# Patient Record
Sex: FEMALE | Race: WHITE | NOT HISPANIC OR LATINO | Employment: FULL TIME | URBAN - METROPOLITAN AREA
[De-identification: names, ages, dates, MRNs, and addresses within clinical notes are randomized per-mention and may not be internally consistent; named-entity substitution may affect disease eponyms.]

---

## 2019-05-08 ENCOUNTER — TELEPHONE (OUTPATIENT)
Dept: TRANSPLANT | Facility: CLINIC | Age: 44
End: 2019-05-08

## 2019-05-08 NOTE — LETTER
May 8, 2019    Marty Moran MD  [facility] [address]      Dear Dr. Moran    Patient: Jazzy Borges   MR Number: [id number]   YOB: 1975     Thank you for the referral of Jazzy Borges to the [facility] program. An initial appointment will be scheduled for your patient with one of our Hepatologists.      Thank you again for your trust in our program.  If there is anything we can do for you or your staff, please feel free to contact us.        Sincerely,        [facility]  [address] [place] [id number]  [phone]

## 2019-05-08 NOTE — TELEPHONE ENCOUNTER
----- Message from Jonn Elizalde sent at 2019  9:15 AM CDT -----    Returned call to pt and told her that we rec'orlando her ref on  and it is pending review by the nurse. Explained to her how the process and the difference between gen hepat and liver txp.    -------------------------------------------------------------------------------------    Message   Received: Today   Message Contents   Sammie Washburn sent to [facility] Liver Referral Pool  Caller: elli Borges [phone] (Today,  9:03 AM)         Pt calling to check status of referral that was sent 2 weeks ago   Referral was sent by Dr. Mohan of [place], [place]     NP Elli Borges    79     pls contact pt with update

## 2019-05-08 NOTE — TELEPHONE ENCOUNTER
Spoke to pt re referral received. Informed MELD 7 and we will see her in our hepatology clinic. Pt agreed     Pt records reviewed.  Pt will be referred to Hepatology due to ARAYA MELD  7  Initial referral received  from Dr. Marty Moran III  Referral letter sent to provider and patient.

## 2019-06-10 ENCOUNTER — OFFICE VISIT (OUTPATIENT)
Dept: HEPATOLOGY | Facility: CLINIC | Age: 44
End: 2019-06-10
Payer: COMMERCIAL

## 2019-06-10 ENCOUNTER — LAB VISIT (OUTPATIENT)
Dept: LAB | Facility: HOSPITAL | Age: 44
End: 2019-06-10
Payer: COMMERCIAL

## 2019-06-10 VITALS
DIASTOLIC BLOOD PRESSURE: 75 MMHG | SYSTOLIC BLOOD PRESSURE: 107 MMHG | OXYGEN SATURATION: 98 % | WEIGHT: 228.38 LBS | HEIGHT: 66 IN | HEART RATE: 97 BPM | BODY MASS INDEX: 36.7 KG/M2

## 2019-06-10 DIAGNOSIS — K74.60 CIRRHOSIS OF LIVER WITHOUT ASCITES, UNSPECIFIED HEPATIC CIRRHOSIS TYPE: Primary | ICD-10-CM

## 2019-06-10 DIAGNOSIS — E28.2 PCOS (POLYCYSTIC OVARIAN SYNDROME): ICD-10-CM

## 2019-06-10 DIAGNOSIS — K74.60 CIRRHOSIS OF LIVER WITHOUT ASCITES, UNSPECIFIED HEPATIC CIRRHOSIS TYPE: ICD-10-CM

## 2019-06-10 DIAGNOSIS — E11.9 TYPE 2 DIABETES MELLITUS WITHOUT COMPLICATION, UNSPECIFIED WHETHER LONG TERM INSULIN USE: ICD-10-CM

## 2019-06-10 DIAGNOSIS — E78.49 OTHER HYPERLIPIDEMIA: ICD-10-CM

## 2019-06-10 DIAGNOSIS — I10 ESSENTIAL HYPERTENSION: ICD-10-CM

## 2019-06-10 LAB
AFP SERPL-MCNC: 2.6 NG/ML (ref 0–8.4)
CERULOPLASMIN SERPL-MCNC: 38 MG/DL (ref 15–45)
FERRITIN SERPL-MCNC: 48 NG/ML (ref 20–300)
IGG SERPL-MCNC: 851 MG/DL (ref 650–1600)
INR PPP: 1 (ref 0.8–1.2)
IRON SERPL-MCNC: 59 UG/DL (ref 30–160)
PROTHROMBIN TIME: 10.2 SEC (ref 9–12.5)
SATURATED IRON: 12 % (ref 20–50)
TOTAL IRON BINDING CAPACITY: 505 UG/DL (ref 250–450)
TRANSFERRIN SERPL-MCNC: 341 MG/DL (ref 200–375)

## 2019-06-10 PROCEDURE — 82103 ALPHA-1-ANTITRYPSIN TOTAL: CPT

## 2019-06-10 PROCEDURE — 99999 PR PBB SHADOW E&M-EST. PATIENT-LVL III: ICD-10-PCS | Mod: PBBFAC,,, | Performed by: PHYSICIAN ASSISTANT

## 2019-06-10 PROCEDURE — 82784 ASSAY IGA/IGD/IGG/IGM EACH: CPT

## 2019-06-10 PROCEDURE — 86256 FLUORESCENT ANTIBODY TITER: CPT | Mod: 91

## 2019-06-10 PROCEDURE — 86706 HEP B SURFACE ANTIBODY: CPT

## 2019-06-10 PROCEDURE — 3008F PR BODY MASS INDEX (BMI) DOCUMENTED: ICD-10-PCS | Mod: CPTII,S$GLB,, | Performed by: PHYSICIAN ASSISTANT

## 2019-06-10 PROCEDURE — 86790 VIRUS ANTIBODY NOS: CPT

## 2019-06-10 PROCEDURE — 99999 PR PBB SHADOW E&M-EST. PATIENT-LVL III: CPT | Mod: PBBFAC,,, | Performed by: PHYSICIAN ASSISTANT

## 2019-06-10 PROCEDURE — 82728 ASSAY OF FERRITIN: CPT

## 2019-06-10 PROCEDURE — 87340 HEPATITIS B SURFACE AG IA: CPT

## 2019-06-10 PROCEDURE — 86704 HEP B CORE ANTIBODY TOTAL: CPT

## 2019-06-10 PROCEDURE — 3008F BODY MASS INDEX DOCD: CPT | Mod: CPTII,S$GLB,, | Performed by: PHYSICIAN ASSISTANT

## 2019-06-10 PROCEDURE — 99204 PR OFFICE/OUTPT VISIT, NEW, LEVL IV, 45-59 MIN: ICD-10-PCS | Mod: S$GLB,,, | Performed by: PHYSICIAN ASSISTANT

## 2019-06-10 PROCEDURE — 3074F PR MOST RECENT SYSTOLIC BLOOD PRESSURE < 130 MM HG: ICD-10-PCS | Mod: CPTII,S$GLB,, | Performed by: PHYSICIAN ASSISTANT

## 2019-06-10 PROCEDURE — 86803 HEPATITIS C AB TEST: CPT

## 2019-06-10 PROCEDURE — 80321 ALCOHOLS BIOMARKERS 1OR 2: CPT

## 2019-06-10 PROCEDURE — 85610 PROTHROMBIN TIME: CPT

## 2019-06-10 PROCEDURE — 3074F SYST BP LT 130 MM HG: CPT | Mod: CPTII,S$GLB,, | Performed by: PHYSICIAN ASSISTANT

## 2019-06-10 PROCEDURE — 83540 ASSAY OF IRON: CPT

## 2019-06-10 PROCEDURE — 86038 ANTINUCLEAR ANTIBODIES: CPT

## 2019-06-10 PROCEDURE — 3078F DIAST BP <80 MM HG: CPT | Mod: CPTII,S$GLB,, | Performed by: PHYSICIAN ASSISTANT

## 2019-06-10 PROCEDURE — 3078F PR MOST RECENT DIASTOLIC BLOOD PRESSURE < 80 MM HG: ICD-10-PCS | Mod: CPTII,S$GLB,, | Performed by: PHYSICIAN ASSISTANT

## 2019-06-10 PROCEDURE — 82390 ASSAY OF CERULOPLASMIN: CPT

## 2019-06-10 PROCEDURE — 36415 COLL VENOUS BLD VENIPUNCTURE: CPT

## 2019-06-10 PROCEDURE — 86235 NUCLEAR ANTIGEN ANTIBODY: CPT

## 2019-06-10 PROCEDURE — 99204 OFFICE O/P NEW MOD 45 MIN: CPT | Mod: S$GLB,,, | Performed by: PHYSICIAN ASSISTANT

## 2019-06-10 PROCEDURE — 82105 ALPHA-FETOPROTEIN SERUM: CPT

## 2019-06-10 RX ORDER — ATORVASTATIN CALCIUM 20 MG/1
20 TABLET, FILM COATED ORAL DAILY
Refills: 1 | COMMUNITY
Start: 2019-05-15

## 2019-06-10 RX ORDER — EMPAGLIFLOZIN, METFORMIN HYDROCHLORIDE 25; 1000 MG/1; MG/1
2 TABLET, EXTENDED RELEASE ORAL DAILY
Refills: 1 | COMMUNITY
Start: 2019-05-15

## 2019-06-10 RX ORDER — LISINOPRIL 2.5 MG/1
2.5 TABLET ORAL DAILY
Refills: 3 | COMMUNITY
Start: 2019-05-15

## 2019-06-10 RX ORDER — ASPIRIN 325 MG
50000 TABLET, DELAYED RELEASE (ENTERIC COATED) ORAL
Refills: 1 | COMMUNITY
Start: 2019-04-03 | End: 2023-04-14

## 2019-06-10 NOTE — PROGRESS NOTES
HEPATOLOGY CLINIC VISIT NOTE     REFERRING PROVIDER: Dr. Marty Moran    REASON FOR VISIT: ARAYA cirrhosis     HISTORY: This is a 43 y.o. White female here for evaluation of ARAYA cirrhosis, referred by outside facility. ARAYA biopsy proven. No family history of liver disease. PMH of HTN, HLD, DMII, and PCOS. BMI is 36. Labs note elevated liver enzymes, normal PLTs, normal synthetic liver function. MELD is 7. Had recent abdominal imaging but imaging not available for me to review.     Pt denies signs of hepatic decompensation including: jaundice, dark urine, abdominal distention, hematemesis, melena, slowed mentation.    Pt denies signs of hepatic decompensation including: jaundice, dark urine, abdominal distention, hematemesis, melena, slowed mentation.    Reports uncontrolled DMII with BG running >200 most days.     Has not had EGD              Past Medical History:   Diagnosis Date    Diabetes     Essential hypertension 6/10/2019    Gastroparesis     Other hyperlipidemia 6/10/2019    PCOS (polycystic ovarian syndrome)     Type 2 diabetes mellitus 6/10/2019     No past surgical history on file.    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    Social History     Tobacco Use   Smoking Status Never Smoker     Social History     Substance and Sexual Activity   Alcohol Use Not Currently   twice per year    Social History     Substance and Sexual Activity   Drug Use Not on file     ROS:   No fever, chills,(+)fatigue  No chest pain, palpitations, dyspnea, cough  (+)  abdominal pain, change in bowel pattern, nausea, vomiting  No headaches, visual changes  No lower extremity edema  No depression or anxiety    PHYSICAL EXAM:  Friendly White female, in no acute distress; alert and oriented to person, place and time  VITALS: reviewed  HEENT: Sclerae anicteric.   NECK: Supple  CVS: Regular rate and rhythm. No murmurs  LUNGS: Normal respiratory effort. Clear bilaterally  ABDOMEN:  Flat, soft, nontender. No organomegaly or masses. No ascites or hernias. Good bowel sounds.    SKIN: Warm and dry. No jaundice, No obvious rashes.   EXTREMITIES: No lower extremity edema  NEURO/PSYCH: Normal gate. Memory intact. Thought and speech pattern appropriate. Behavior normal. No depression or anxiety noted.    RECENT LABS:  No results found for: WBC, HGB, PLT  No results found for: INR  No results found for: AST, ALT, BILITOT, ALBUMIN, ALKPHOS, CREATININE, BUN, NA, K, AFP    RECENT IMAGING:  U/S abdomen    ASSESSMENT  43 y.o. White female with:  1. ARAYA CIRRHOSIS  -- risk factors: HTN, HLD, DMII, PCOS, BMI 36  -- have biopsy sent for second read  -- serological eval to rule out additional causes of liver disease  -- discussed diet, exercise and weight loss  -- no indication for liver transplant evaluation at this time, MELD 7  - HCC screening:   - U/S:   - AFP:    ( )Portal HTN:   - EGD:     2. UNCONTROLLED DMII   -- pt to see endocrine locally      EDUCATION:  Discussed evidence that indicates that pt has cirrhosis.   -- Discussed the basics about liver fibrosis /scarring and how that relates to liver function. Reviewed the significance of the MELD scoring system as it relates to indication for transplant.  -- Signs and symptoms of hepatic decompensation were reviewed, including jaundice, ascites, and slowed mentation due to hepatic encephalopathy. The patient should seek medical attention if any of these things occur. We discussed the potential for bleeding from esophageal varices with symptoms of hematemesis and melena. The patient should report to the Emergency Department for these symptoms.   -- We discussed the increased risk of hepatocellular carcinoma due to cirrhosis.   Continued screening every six months with ultrasound and AFP is recommended.   -- Discussed portal hypertension, including potential development of esophageal varices. Role of EGD in screening for varices was reviewed.   Cirrhosis  education booklet given to pt    Recommended patient avoid alcohol and raw seafood. Limit tylenol to 2000mg daily    We discussed the manifestations of ARAYA. At this time there is no FDA approved therapy for steatohepatitis.   The patient and I discussed the importance of diet, exercise, and weight loss for management of steatohepatitis. We discussed a low fat, low carb/low sugar, high fiber diet, and a goal of 30 minutes of exercise 5 times per week.   Pt is aware that steatohepatitis can progress to cirrhosis putting one at increased risk for liver cancer, liver failure, and death.     PLAN:  1. Labs today  2. Obtain outside liver biopsy slides  3. HCC screening likely in 6 months, need most recent imaging.     Thank you for allowing me to participate in the care of Jazzy Montero PA-C

## 2019-06-10 NOTE — PROGRESS NOTES
I have personally performed a face to face diagnostic evaluation on this patient. I have reviewed and agree with today's findings and the care plan outlined by Charlie Montero PA-C with following comments:     Jazzy Borges is a pleasant 43 y.o. female who was referred for biopsy proven ARAYA. No decompensation. MELD: n/a    The patient's risk factors for NAFLD include:    · Obesity/overweight                     Yes Body mass index is 36.86 kg/m².  · Dyslipidemia                                yes  · Diabetes                                      yes  · Family history of diabetes           no    Agree with obtaining VCTE for hepatic fibrosis/steatosis assessment and checking serologies to rule out other causes of chronic liver diseases for the sake of thoroughness in work up. Agree with HCC screening with AFP and US every 6 months.    We have counseled the patient regarding the life-style modifications: weight loss, low calorie/low fat diet and exercise.

## 2019-06-10 NOTE — LETTER
Lisa 10, 2019      Marty Moran MD  [address]  [place] [address]           [facility]  [address]  [facility] [address]  Phone: [phone]  Fax: [phone]          Patient: Jazzy Borges   MR Number: [id number]   YOB: 1975   Date of Visit: 6/10/2019       Dear Dr. Marty Moran:    Thank you for referring Jazzy Borges to me for evaluation. Attached you will find relevant portions of my assessment and plan of care.    If you have questions, please do not hesitate to call me. I look forward to following Jazzy Borges along with you.    Sincerely,    Charlie Montero PA-C    Enclosure  CC:  No Recipients    If you would like to receive this communication electronically, please contact [email or web address] or [phone] to request more information on [facility] access.    For providers and/or their staff who would like to refer a patient to [facility], please contact us through our one-stop-shop provider referral line, [facility], at [phone].    If you feel you have received this communication in error or would no longer like to receive these types of communications, please e-mail [email or web address]

## 2019-06-11 ENCOUNTER — TELEPHONE (OUTPATIENT)
Dept: HEPATOLOGY | Facility: CLINIC | Age: 44
End: 2019-06-11

## 2019-06-11 LAB
ANA SER QL IF: NORMAL
HBV CORE AB SERPL QL IA: NEGATIVE
HBV SURFACE AB SER-ACNC: NEGATIVE M[IU]/ML
HBV SURFACE AG SERPL QL IA: NEGATIVE
HCV AB SERPL QL IA: NEGATIVE
HEPATITIS A ANTIBODY, IGG: NEGATIVE
MITOCHONDRIA AB TITR SER IF: NORMAL {TITER}
SMOOTH MUSCLE AB TITR SER IF: NORMAL {TITER}

## 2019-06-11 NOTE — TELEPHONE ENCOUNTER
FAXED AUTHORIZATION FOR RELEASE OF INFO TO     [facility]  MEDICAL RECORDS  P# [phone]  F# [phone]    REQUESTING CT-SCAN AND ABD US RESULTS TO BE FAX TO US.     PATHOLOGY DEPT  P# [phone]  F# [phone]    REQUESTING LIVER BIOPSY SLIDES TO BE MAIL TO US ASAP!     FED EX INFO GIVEN.

## 2019-06-14 LAB
A1AT PHENOTYP SERPL-IMP: NORMAL BANDS
A1AT SERPL NEPH-MCNC: 175 MG/DL (ref 100–190)

## 2019-06-17 ENCOUNTER — TELEPHONE (OUTPATIENT)
Dept: HEPATOLOGY | Facility: CLINIC | Age: 44
End: 2019-06-17

## 2019-06-17 DIAGNOSIS — K75.81 NASH (NONALCOHOLIC STEATOHEPATITIS): Primary | ICD-10-CM

## 2019-06-17 PROCEDURE — 88321 CONSLTJ&REPRT SLD PREP ELSWR: CPT | Mod: ,,, | Performed by: PATHOLOGY

## 2019-06-17 PROCEDURE — 88321 TISSUE SPECIMEN TO PATHOLOGY: ICD-10-PCS | Mod: ,,, | Performed by: PATHOLOGY

## 2019-06-17 NOTE — TELEPHONE ENCOUNTER
We received livery biopsy slides X 3, [id number] collected 4/9/2019, from [facility] Department of Pathology, [address] [place] [address], [phone] and fax [phone]. Fed Ex # [id number]. Slides brought to Pathology for processing for a 2nd opinion.

## 2019-06-18 ENCOUNTER — TELEPHONE (OUTPATIENT)
Dept: HEPATOLOGY | Facility: CLINIC | Age: 44
End: 2019-06-18

## 2019-06-18 DIAGNOSIS — K74.60 CIRRHOSIS OF LIVER WITHOUT ASCITES, UNSPECIFIED HEPATIC CIRRHOSIS TYPE: Primary | ICD-10-CM

## 2019-06-18 NOTE — TELEPHONE ENCOUNTER
Please let her know labs are stable    External imaging reviewed, repeat U/S due 08/2019    We received her liver biopsy slides and have sent them for a second read.     I would recommend f/u 08/2019, please schedule, we can do HCCat that visit    Thanks

## 2019-06-19 LAB — PHOSPHATIDYLETHANOL (PETH): NEGATIVE NG/ML

## 2019-07-12 ENCOUNTER — TELEPHONE (OUTPATIENT)
Dept: HEPATOLOGY | Facility: CLINIC | Age: 44
End: 2019-07-12

## 2019-07-12 NOTE — TELEPHONE ENCOUNTER
I spoke with Mary in Pathology at [phone].  She states their system is down and that she will calls us back regarding liver biopsy slide reading.

## 2019-07-12 NOTE — TELEPHONE ENCOUNTER
----- Message from Charlie Montero PA-C sent at 7/12/2019 11:18 AM CDT -----  Her outside slides are still processing. Can you follow up and make sure it's not due to the new order requisition process

## 2019-08-05 ENCOUNTER — OFFICE VISIT (OUTPATIENT)
Dept: HEPATOLOGY | Facility: CLINIC | Age: 44
End: 2019-08-05
Payer: COMMERCIAL

## 2019-08-05 ENCOUNTER — TELEPHONE (OUTPATIENT)
Dept: HEPATOLOGY | Facility: CLINIC | Age: 44
End: 2019-08-05

## 2019-08-05 ENCOUNTER — HOSPITAL ENCOUNTER (OUTPATIENT)
Dept: RADIOLOGY | Facility: HOSPITAL | Age: 44
Discharge: HOME | End: 2019-08-05
Attending: PHYSICIAN ASSISTANT
Payer: COMMERCIAL

## 2019-08-05 VITALS
SYSTOLIC BLOOD PRESSURE: 132 MMHG | OXYGEN SATURATION: 100 % | HEART RATE: 79 BPM | DIASTOLIC BLOOD PRESSURE: 72 MMHG | BODY MASS INDEX: 34.98 KG/M2 | WEIGHT: 217.63 LBS | HEIGHT: 66 IN

## 2019-08-05 DIAGNOSIS — K74.60 LIVER CIRRHOSIS SECONDARY TO NASH: Primary | ICD-10-CM

## 2019-08-05 DIAGNOSIS — K74.60 CIRRHOSIS OF LIVER WITHOUT ASCITES, UNSPECIFIED HEPATIC CIRRHOSIS TYPE: ICD-10-CM

## 2019-08-05 DIAGNOSIS — K75.81 LIVER CIRRHOSIS SECONDARY TO NASH: Primary | ICD-10-CM

## 2019-08-05 PROCEDURE — 3078F DIAST BP <80 MM HG: CPT | Mod: CPTII,S$GLB,, | Performed by: PHYSICIAN ASSISTANT

## 2019-08-05 PROCEDURE — 3075F PR MOST RECENT SYSTOLIC BLOOD PRESS GE 130-139MM HG: ICD-10-PCS | Mod: CPTII,S$GLB,, | Performed by: PHYSICIAN ASSISTANT

## 2019-08-05 PROCEDURE — 76700 US EXAM ABDOM COMPLETE: CPT | Mod: TC

## 2019-08-05 PROCEDURE — 3078F PR MOST RECENT DIASTOLIC BLOOD PRESSURE < 80 MM HG: ICD-10-PCS | Mod: CPTII,S$GLB,, | Performed by: PHYSICIAN ASSISTANT

## 2019-08-05 PROCEDURE — 99999 PR PBB SHADOW E&M-EST. PATIENT-LVL III: ICD-10-PCS | Mod: PBBFAC,,, | Performed by: PHYSICIAN ASSISTANT

## 2019-08-05 PROCEDURE — 76700 US ABDOMEN COMPLETE: ICD-10-PCS | Mod: 26,,, | Performed by: RADIOLOGY

## 2019-08-05 PROCEDURE — 3008F PR BODY MASS INDEX (BMI) DOCUMENTED: ICD-10-PCS | Mod: CPTII,S$GLB,, | Performed by: PHYSICIAN ASSISTANT

## 2019-08-05 PROCEDURE — 3008F BODY MASS INDEX DOCD: CPT | Mod: CPTII,S$GLB,, | Performed by: PHYSICIAN ASSISTANT

## 2019-08-05 PROCEDURE — 3075F SYST BP GE 130 - 139MM HG: CPT | Mod: CPTII,S$GLB,, | Performed by: PHYSICIAN ASSISTANT

## 2019-08-05 PROCEDURE — 99999 PR PBB SHADOW E&M-EST. PATIENT-LVL III: CPT | Mod: PBBFAC,,, | Performed by: PHYSICIAN ASSISTANT

## 2019-08-05 PROCEDURE — 99214 OFFICE O/P EST MOD 30 MIN: CPT | Mod: S$GLB,,, | Performed by: PHYSICIAN ASSISTANT

## 2019-08-05 PROCEDURE — 99214 PR OFFICE/OUTPT VISIT, EST, LEVL IV, 30-39 MIN: ICD-10-PCS | Mod: S$GLB,,, | Performed by: PHYSICIAN ASSISTANT

## 2019-08-05 PROCEDURE — 76700 US EXAM ABDOM COMPLETE: CPT | Mod: 26,,, | Performed by: RADIOLOGY

## 2019-08-05 NOTE — TELEPHONE ENCOUNTER
----- Message from Charlie Montero PA-C sent at 8/5/2019 12:51 PM CDT -----  Please mail orders for HCC screening in 6 months  Please recall for f/u with HCC screening in 1 year  Thanks

## 2019-08-05 NOTE — Clinical Note
Please mail orders for HCC screening in 6 monthsPlease recall for f/u with HCC screening in 1 yearThanks

## 2019-08-05 NOTE — PROGRESS NOTES
HEPATOLOGY CLINIC VISIT NOTE     REFERRING PROVIDER: Dr. Marty Moran    REASON FOR VISIT: ARAYA cirrhosis     HISTORY: This is a 43 y.o. White female here for follow up of well compensated ARAYA cirrhosis, referred by outside facility. ARAYA biopsy proven. Biopsy slides obtained and path confirmed. No family history of liver disease. PMH of HTN, HLD, DMII, and PCOS. BMI is 36. Labs note elevated liver enzymes, normal PLTs, normal synthetic liver function. MELD is 7. Had recent abdominal imaging but imaging not available for me to review.     Pt denies signs of hepatic decompensation including: jaundice, dark urine, abdominal distention, hematemesis, melena, slowed mentation.    Since last visit, down 11 lbs through diet, blood glucose now under much better control. A1c ~6 per pt. Still has gastroparesis symptoms     Has not had EGD              Past Medical History:   Diagnosis Date    Diabetes     Essential hypertension 6/10/2019    Gastroparesis     Other hyperlipidemia 6/10/2019    PCOS (polycystic ovarian syndrome)     Type 2 diabetes mellitus 6/10/2019     No past surgical history on file.    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    Social History     Tobacco Use   Smoking Status Never Smoker   Smokeless Tobacco Never Used     Social History     Substance and Sexual Activity   Alcohol Use Not Currently   twice per year    Social History     Substance and Sexual Activity   Drug Use Never     ROS:   No fever, chills,(+)fatigue  No chest pain, palpitations, dyspnea, cough  (+)  abdominal pain, change in bowel pattern, nausea, vomiting  No headaches, visual changes  No lower extremity edema  No depression or anxiety    PHYSICAL EXAM:  Friendly White female, in no acute distress; alert and oriented to person, place and time  VITALS: reviewed  HEENT: Sclerae anicteric.   NECK: Supple  CVS: Regular rate and rhythm. No murmurs  LUNGS: Normal respiratory effort.  Clear bilaterally  ABDOMEN: Flat, soft, nontender. No organomegaly or masses. No ascites or hernias. Good bowel sounds.    SKIN: Warm and dry. No jaundice, No obvious rashes.   EXTREMITIES: No lower extremity edema  NEURO/PSYCH: Normal gate. Memory intact. Thought and speech pattern appropriate. Behavior normal. No depression or anxiety noted.    RECENT LABS:  Lab Results   Component Value Date    WBC 3.52 (L) 08/05/2019    HGB 13.7 08/05/2019     08/05/2019     Lab Results   Component Value Date    INR 0.9 08/05/2019     Lab Results   Component Value Date    AFP 2.6 06/10/2019       RECENT IMAGING:  U/S abdomen    ASSESSMENT  43 y.o. White female with:  1. ARAYA CIRRHOSIS  -- risk factors: HTN, HLD, DMII, PCOS, BMI 36  -- negative serological eval  -- discussed diet, exercise and weight loss  -- no indication for liver transplant evaluation at this time, MELD 7  -- continue weight loss efforts and DMII control.   - HCC screening:   - U/S: due 02/2020   - AFP: WNL    (?)Portal HTN:   - EGD: needs EGD       EDUCATION:  Discussed evidence that indicates that pt has cirrhosis.   -- Discussed the basics about liver fibrosis /scarring and how that relates to liver function. Reviewed the significance of the MELD scoring system as it relates to indication for transplant.  -- Signs and symptoms of hepatic decompensation were reviewed, including jaundice, ascites, and slowed mentation due to hepatic encephalopathy. The patient should seek medical attention if any of these things occur. We discussed the potential for bleeding from esophageal varices with symptoms of hematemesis and melena. The patient should report to the Emergency Department for these symptoms.   -- We discussed the increased risk of hepatocellular carcinoma due to cirrhosis.   Continued screening every six months with ultrasound and AFP is recommended.   -- Discussed portal hypertension, including potential development of esophageal varices. Role of  EGD in screening for varices was reviewed.   Cirrhosis education booklet given to pt    Recommended patient avoid alcohol and raw seafood. Limit tylenol to 2000mg daily    We discussed the manifestations of ARAYA. At this time there is no FDA approved therapy for steatohepatitis.   The patient and I discussed the importance of diet, exercise, and weight loss for management of steatohepatitis. We discussed a low fat, low carb/low sugar, high fiber diet, and a goal of 30 minutes of exercise 5 times per week.   Pt is aware that steatohepatitis can progress to cirrhosis putting one at increased risk for liver cancer, liver failure, and death.     PLAN:  1. HCC screening in 6 months, will do locally  2. EGD locally  3. F/u with HCC screening in 1 year  4. Continue weight loss efforts     Thank you for allowing me to participate in the care of Jazzy Montero PA-C

## 2019-09-23 ENCOUNTER — PATIENT MESSAGE (OUTPATIENT)
Dept: HEPATOLOGY | Facility: CLINIC | Age: 44
End: 2019-09-23

## 2020-03-04 ENCOUNTER — TELEPHONE (OUTPATIENT)
Dept: HEPATOLOGY | Facility: CLINIC | Age: 45
End: 2020-03-04

## 2020-03-04 DIAGNOSIS — K74.60 LIVER CIRRHOSIS SECONDARY TO NASH: Primary | ICD-10-CM

## 2020-03-04 DIAGNOSIS — K75.81 LIVER CIRRHOSIS SECONDARY TO NASH: Primary | ICD-10-CM

## 2020-03-04 NOTE — TELEPHONE ENCOUNTER
----- Message from Marisa Mcdowell NP sent at 3/4/2020  3:12 PM CST -----  Contact: Alyssa Lombardi with [facility] in [place] [phone]      ----- Message -----  From: Sari Walton MA  Sent: 3/4/2020   1:41 PM CST  To: Marisa Mcdowell NP    Can you please put in external order so I can fax it to Alyssa Lombardi  ----- Message -----  From: Lelia Watson MA  Sent: 3/4/2020   1:34 PM CST  To: Steven RENEE       They are needing an order faxed for pt's U/S of the abdomen.  Pt has an appt there this Friday 3/6/2020    Fax# [phone]       Alyssa Lombardi with [facility] in [place] [phone]

## 2020-03-04 NOTE — TELEPHONE ENCOUNTER
Marisa put in outside ultrasound orders for this patient. I faxed the order with cover sheet to [phone].

## 2020-03-05 LAB
ALBUMIN SERPL-MCNC: 4.6 G/DL (ref 3.8–4.8)
ALBUMIN/GLOB SERPL: 2 {RATIO} (ref 1.2–2.2)
ALP SERPL-CCNC: 97 IU/L (ref 39–117)
ALT SERPL-CCNC: 16 IU/L (ref 0–32)
AST SERPL-CCNC: 17 IU/L (ref 0–40)
BASOPHILS # BLD AUTO: 0 X10E3/UL (ref 0–0.2)
BASOPHILS NFR BLD AUTO: 1 %
BILIRUB SERPL-MCNC: 0.5 MG/DL (ref 0–1.2)
BUN SERPL-MCNC: 13 MG/DL (ref 6–24)
BUN/CREAT SERPL: 16 (ref 9–23)
CALCIUM SERPL-MCNC: 9 MG/DL (ref 8.7–10.2)
CHLORIDE SERPL-SCNC: 106 MMOL/L (ref 96–106)
CO2 SERPL-SCNC: 19 MMOL/L (ref 20–29)
CREAT SERPL-MCNC: 0.82 MG/DL (ref 0.57–1)
EOSINOPHIL # BLD AUTO: 0.3 X10E3/UL (ref 0–0.4)
EOSINOPHIL NFR BLD AUTO: 5 %
ERYTHROCYTE [DISTWIDTH] IN BLOOD BY AUTOMATED COUNT: 13.5 % (ref 11.7–15.4)
GLOBULIN SER CALC-MCNC: 2.3 G/DL (ref 1.5–4.5)
GLUCOSE SERPL-MCNC: 112 MG/DL (ref 65–99)
HCT VFR BLD AUTO: 42.2 % (ref 34–46.6)
HGB BLD-MCNC: 14.2 G/DL (ref 11.1–15.9)
IMM GRANULOCYTES # BLD AUTO: 0 X10E3/UL (ref 0–0.1)
IMM GRANULOCYTES NFR BLD AUTO: 0 %
INR PPP: 0.9 (ref 0.8–1.2)
LYMPHOCYTES # BLD AUTO: 1.5 X10E3/UL (ref 0.7–3.1)
LYMPHOCYTES NFR BLD AUTO: 29 %
MCH RBC QN AUTO: 30.5 PG (ref 26.6–33)
MCHC RBC AUTO-ENTMCNC: 33.6 G/DL (ref 31.5–35.7)
MCV RBC AUTO: 91 FL (ref 79–97)
MONOCYTES # BLD AUTO: 0.4 X10E3/UL (ref 0.1–0.9)
MONOCYTES NFR BLD AUTO: 7 %
NEUTROPHILS # BLD AUTO: 3 X10E3/UL (ref 1.4–7)
NEUTROPHILS NFR BLD AUTO: 58 %
PLATELET # BLD AUTO: 216 X10E3/UL (ref 150–450)
POTASSIUM SERPL-SCNC: 4.7 MMOL/L (ref 3.5–5.2)
PROT SERPL-MCNC: 6.9 G/DL (ref 6–8.5)
PROTHROMBIN TIME: 9.9 SEC (ref 9.1–12)
RBC # BLD AUTO: 4.66 X10E6/UL (ref 3.77–5.28)
SODIUM SERPL-SCNC: 143 MMOL/L (ref 134–144)
WBC # BLD AUTO: 5.2 X10E3/UL (ref 3.4–10.8)

## 2020-03-06 DIAGNOSIS — K74.60 LIVER CIRRHOSIS SECONDARY TO NASH: Primary | ICD-10-CM

## 2020-03-06 DIAGNOSIS — K75.81 LIVER CIRRHOSIS SECONDARY TO NASH: Primary | ICD-10-CM

## 2020-09-08 ENCOUNTER — HOSPITAL ENCOUNTER (OUTPATIENT)
Dept: RADIOLOGY | Facility: HOSPITAL | Age: 45
Discharge: HOME | End: 2020-09-08
Attending: NURSE PRACTITIONER
Payer: COMMERCIAL

## 2020-09-08 ENCOUNTER — OFFICE VISIT (OUTPATIENT)
Dept: HEPATOLOGY | Facility: CLINIC | Age: 45
End: 2020-09-08
Payer: COMMERCIAL

## 2020-09-08 ENCOUNTER — TELEPHONE (OUTPATIENT)
Dept: HEPATOLOGY | Facility: CLINIC | Age: 45
End: 2020-09-08

## 2020-09-08 VITALS
RESPIRATION RATE: 18 BRPM | SYSTOLIC BLOOD PRESSURE: 117 MMHG | WEIGHT: 231.06 LBS | OXYGEN SATURATION: 97 % | BODY MASS INDEX: 37.14 KG/M2 | HEART RATE: 92 BPM | HEIGHT: 66 IN | TEMPERATURE: 97 F | DIASTOLIC BLOOD PRESSURE: 65 MMHG

## 2020-09-08 DIAGNOSIS — E66.9 OBESITY (BMI 30-39.9): ICD-10-CM

## 2020-09-08 DIAGNOSIS — E11.9 TYPE 2 DIABETES MELLITUS WITHOUT COMPLICATION, WITHOUT LONG-TERM CURRENT USE OF INSULIN: ICD-10-CM

## 2020-09-08 DIAGNOSIS — K74.60 LIVER CIRRHOSIS SECONDARY TO NASH: ICD-10-CM

## 2020-09-08 DIAGNOSIS — I10 ESSENTIAL HYPERTENSION: ICD-10-CM

## 2020-09-08 DIAGNOSIS — K75.81 LIVER CIRRHOSIS SECONDARY TO NASH: ICD-10-CM

## 2020-09-08 DIAGNOSIS — K74.60 LIVER CIRRHOSIS SECONDARY TO NASH: Primary | ICD-10-CM

## 2020-09-08 DIAGNOSIS — E28.2 PCOS (POLYCYSTIC OVARIAN SYNDROME): ICD-10-CM

## 2020-09-08 DIAGNOSIS — K75.81 LIVER CIRRHOSIS SECONDARY TO NASH: Primary | ICD-10-CM

## 2020-09-08 DIAGNOSIS — E78.49 OTHER HYPERLIPIDEMIA: ICD-10-CM

## 2020-09-08 PROCEDURE — 99999 PR PBB SHADOW E&M-EST. PATIENT-LVL V: CPT | Mod: PBBFAC,,, | Performed by: NURSE PRACTITIONER

## 2020-09-08 PROCEDURE — 3078F DIAST BP <80 MM HG: CPT | Mod: CPTII,S$GLB,, | Performed by: NURSE PRACTITIONER

## 2020-09-08 PROCEDURE — 3008F PR BODY MASS INDEX (BMI) DOCUMENTED: ICD-10-PCS | Mod: CPTII,S$GLB,, | Performed by: NURSE PRACTITIONER

## 2020-09-08 PROCEDURE — 3078F PR MOST RECENT DIASTOLIC BLOOD PRESSURE < 80 MM HG: ICD-10-PCS | Mod: CPTII,S$GLB,, | Performed by: NURSE PRACTITIONER

## 2020-09-08 PROCEDURE — 76700 US EXAM ABDOM COMPLETE: CPT | Mod: 26,,, | Performed by: RADIOLOGY

## 2020-09-08 PROCEDURE — 3008F BODY MASS INDEX DOCD: CPT | Mod: CPTII,S$GLB,, | Performed by: NURSE PRACTITIONER

## 2020-09-08 PROCEDURE — 99214 OFFICE O/P EST MOD 30 MIN: CPT | Mod: S$GLB,,, | Performed by: NURSE PRACTITIONER

## 2020-09-08 PROCEDURE — 3074F SYST BP LT 130 MM HG: CPT | Mod: CPTII,S$GLB,, | Performed by: NURSE PRACTITIONER

## 2020-09-08 PROCEDURE — 99999 PR PBB SHADOW E&M-EST. PATIENT-LVL V: ICD-10-PCS | Mod: PBBFAC,,, | Performed by: NURSE PRACTITIONER

## 2020-09-08 PROCEDURE — 99214 PR OFFICE/OUTPT VISIT, EST, LEVL IV, 30-39 MIN: ICD-10-PCS | Mod: S$GLB,,, | Performed by: NURSE PRACTITIONER

## 2020-09-08 PROCEDURE — 76700 US EXAM ABDOM COMPLETE: CPT | Mod: TC

## 2020-09-08 PROCEDURE — 3074F PR MOST RECENT SYSTOLIC BLOOD PRESSURE < 130 MM HG: ICD-10-PCS | Mod: CPTII,S$GLB,, | Performed by: NURSE PRACTITIONER

## 2020-09-08 PROCEDURE — 76700 US ABDOMEN COMPLETE: ICD-10-PCS | Mod: 26,,, | Performed by: RADIOLOGY

## 2020-09-08 RX ORDER — ETONOGESTREL AND ETHINYL ESTRADIOL .12; .015 MG/D; MG/D
RING VAGINAL
COMMUNITY
Start: 2020-07-29

## 2020-09-08 NOTE — PROGRESS NOTES
[facility] Established Patient Visit    Reason for Visit:  cirrhosis    HPI:  This is a 44 y.o. 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 Biopsy slides obtained and path confirmed.     Diagnosis of cirrhosis based on Biopsy done 4/2019 at outside facility - noted ARAYA, cirrhosis    Previous serologic w/u was negative for Dylan's, alpha-1 antitrypsin deficiency, hemochromatosis, autoimmune etiology, and viral hepatitis B and C.     Risk factors for NAFLD include obesity, HTN, HLD, DM, PCOS    Interval HPI: Presents today with significant other. No s/s of hepatic decompensation: no ascites, HE or h/o variceal bleeding  Due for EGD, gets at outside facility     Abd U/S done 9/2020 showed no lesions    Lab Results   Component Value Date    ALT 16 09/08/2020    AST 12 09/08/2020    ALKPHOS 71 09/08/2020    BILITOT 0.3 09/08/2020    ALBUMIN 3.8 09/08/2020    INR 0.9 09/08/2020     09/08/2020     MELD-Na score: 6 at 9/8/2020  8:24 AM  MELD score: 6 at 9/8/2020  8:24 AM  Calculated from:  Serum Creatinine: 0.8 mg/dL (Rounded to 1 mg/dL) at 9/8/2020  8:24 AM  Serum Sodium: 137 mmol/L at 9/8/2020  8:24 AM  Total Bilirubin: 0.3 mg/dL (Rounded to 1 mg/dL) at 9/8/2020  8:24 AM  INR(ratio): 0.9 (Rounded to 1) at 9/8/2020  8:24 AM  Age: 44 years 8 months  MELD has been low, indicating no benefit to liver transplant currently    Cirrhosis Health Maintenance:   -- Last EGD : due, pt will perform with local GI  -- HCC screening   U/S  no lesions, next due 3/2021   AFP  WNL, next due 3/2021  -- Immunity to Hep A and B - needs TwinRix, pt to obtain through PCP    Denies family history of liver disease . Denies current alcohol consumption  or history of significant alcohol consumption in past   Social History     Substance and Sexual Activity   Alcohol Use Not Currently     Denies  jaundice, dark urine, abdominal distention, hematemesis, melena, slowed mentation. No abnormal skin rashes. No generalized joint or muscle pain.     PMHX:  has a past medical history of Diabetes, Essential hypertension (6/10/2019), Gastroparesis, Other hyperlipidemia (6/10/2019), PCOS (polycystic ovarian syndrome), and Type 2 diabetes mellitus (6/10/2019).    PSHX:  has no past surgical history on file.    The patient's social and family histories were reviewed by me and updated in the appropriate section of the electronic medical record.    Review of patient's allergies indicates:  No Known Allergies    Current Outpatient Medications on File Prior to Visit   Medication Sig Dispense Refill    atorvastatin (LIPITOR) 20 MG tablet Take 20 mg by mouth once daily.  1    cholecalciferol, vitamin D3, 50,000 unit capsule Take 50,000 Units by mouth every 30 days.  1    ELURYNG 0.12-0.015 mg/24 hr vaginal ring       lisinopril (PRINIVIL,ZESTRIL) 2.5 MG tablet Take 2.5 mg by mouth once daily.  3    SYNJARDY XR 25-1,000 mg TBph Take 2 tablets by mouth once daily.  1     No current facility-administered medications on file prior to visit.        SOCIAL HISTORY:   Social History     Tobacco Use   Smoking Status Never Smoker   Smokeless Tobacco Never Used       Social History     Substance and Sexual Activity   Alcohol Use Not Currently       Social History     Substance and Sexual Activity   Drug Use Never       ROS:   GENERAL: Denies fever, chills, weight loss/gain, fatigue  HEENT: Denies headaches, dizziness, vision/hearing changes  CARDIOVASCULAR: Denies chest pain, palpitations, or edema  RESPIRATORY: Denies dyspnea, cough  GI: Denies abdominal pain, rectal bleeding, nausea, vomiting. No change in bowel pattern or color  : Denies dysuria, hematuria   SKIN: Denies rash, itching   NEURO: Denies confusion, memory loss, or mood changes  PSYCH: Denies depression or anxiety  HEME/LYMPH: Denies easy bruising or  "bleeding    Objective Findings:    PHYSICAL EXAM:   Friendly White female, in no acute distress; alert and oriented to person, place and time  VITALS: /65 (BP Location: Right arm, Patient Position: Sitting, BP Method: Large (Automatic))   Pulse 92   Temp 97 °F (36.1 °C) (Oral)   Resp 18   Ht 5' 6" (1.676 m)   Wt 104.8 kg (231 lb 0.7 oz)   SpO2 97%   BMI 37.29 kg/m²   HENT: Normocephalic, without obvious abnormality. Oral mucosa pink and moist. Dentition good.  EYES: Sclerae anicteric. No conjunctival pallor.   NECK: Supple. No masses or cervical adenopathy.  CARDIOVASCULAR: Regular rate and rhythm. No murmurs.  RESPIRATORY: Normal respiratory effort. BBS CTA. No wheezes or crackles.  GI: Soft, non-tender, non-distended. No hepatosplenomegaly. No masses palpable. No ascites.  EXTREMITIES:  No clubbing, cyanosis or edema.  SKIN: Warm and dry. No jaundice. No rashes noted to exposed skin. No telangectasias noted. No palmar erythema.  NEURO:  Normal gait. No asterixis.  PSYCH:  Memory intact. Thought and speech pattern appropriate. Behavior normal. No depression or anxiety noted.    DIAGNOSTIC STUDIES:  EGD-  Needs to schedule     ABD. U/S-    Done 9/2020  FINDINGS:  Pancreas: The visualized portions of pancreas appear normal.     Aorta: No aneurysm.     Liver: Enlarged measuring 19.1 cm in length.  Diffusely increased parenchymal echogenicity consistent with steatosis. No focal lesions.     Gallbladder: The gallbladder is surgically absent.     Biliary system: 4 mm common bile duct.  No intrahepatic ductal dilatation.     Inferior vena cava: Normal in appearance.     Right kidney: Normal in size measuring 13.8 cm in length.  No evidence for abnormal renal masses or hydronephrosis.     Left kidney: Normal in size measuring 12.9 cm in length.  No evidence for abnormal renal masses or hydronephrosis.     Spleen: 11.7 x 3.4 cm.  Normal in size with homogeneous echotexture.     Miscellaneous: No " ascites.     Impression:     Hepatomegaly with fatty changes of liver.     S/p cholecystectomy.    LIVER BIOPSY-  Outside biopsy 4/2019  Liver, random, biopsy:  - Cirrhosis, stage 4 (of 4).  - Moderate steatosis, predominantly macrovesicular, 50%.  - Steatohepatitis with ballooning hepatocytes.      EDUCATION:  The disease process and manifestations of cirrhosis were discussed.    Discussed implications of a cirrhosis diagnosis with HCC screenings and EGD and why it is important for us to properly monitor for potential complications.     Signs and symptoms of hepatic decompensation were reviewed, including jaundice, ascites, and slowed mentation due to hepatic encephalopathy. The patient should seek medical attention if any of these things occur.  We discussed the potential for bleeding from esophageal varices with symptoms of hematemesis and melena. The patient should report to the Emergency Department for these symptoms.    We discussed the increased risk of hepatocellular carcinoma due to cirrhosis. Continued screening every six months with ultrasound and AFP is recommended, discussed with patient.     Cirrhosis Counseling  - strict abstinence of alcohol use (includes beer, wine, and/or liquor)  - avoid non-steroidal anti-inflammatory drugs (NSAIDs) such as ibuprofen, Motrin, naprosyn, Alleve due to the risk of kidney damage  - can take acetaminophen (Tylenol), no more than 2000 mg per day  - low sodium (salt) 2 gram per day diet  - high protein diet: 120 grams per day to prevent muscle mass loss. Recommended at least 1 protein shake daily using Premier Protein shakes    - resistance exercises for muscle strength  - avoid raw seafoods due to the risk of fatal Vibrio vulnificus infection  - ultrasound of the liver every 6 months for liver cancer screening  - Upper endoscopy every 1-2 years to screen for varices in the stomach and esophagus      ASSESSMENT & PLAN:  44 y.o. White female with:  1.  Cirrhosis, due to  ARAYA, well compensated  --- cirrhosis diagnosis based on ARAYA  -- MELD-Na score: 6 at 9/8/2020  8:24 AM  MELD score: 6 at 9/8/2020  8:24 AM  Calculated from:  Serum Creatinine: 0.8 mg/dL (Rounded to 1 mg/dL) at 9/8/2020  8:24 AM  Serum Sodium: 137 mmol/L at 9/8/2020  8:24 AM  Total Bilirubin: 0.3 mg/dL (Rounded to 1 mg/dL) at 9/8/2020  8:24 AM  INR(ratio): 0.9 (Rounded to 1) at 9/8/2020  8:24 AM  Age: 44 years 8 months  -- HCC screening: AFP and abd. U/S.. U/S showed no lesions, AFP WNL - both next due 3/2021  -- Immunity to Hep A and B - recommend TwinRix, pt will obtain through outside PCP  -- EGD due, pt will obtain through outside/local GI  --- Previous serologic w/u was negative for Dylan's, alpha-1 antitrypsin deficiency, hemochromatosis, autoimmune etiology, and viral hepatitis B and C.   -- Cirrhosis counseling as noted above and discussed with patient. Discussed with patient that do not recommend any elective abdominal surgery due to risk of hepatic decompensation.      2.  Fatty liver  -- risk factors for fatty liver: obesity, HTN, HLD, DM, PCOS  Recommend:  1. Weight loss goal of 40 lbs  2. Low carb/sugar, high fiber and protein diet  3. Exercise, 5 days per week, 30 minutes per day, as tolerated  4. Recommend good cholesterol, blood pressure, blood sugar levels   5. Consider enrolling in ARAYA fibrosis clinical trails since your fibroscan suggested that you may have fibrosis/scarring in your liver related to fatty liver  - pt interested, will message to add to research list       Due to far distance to travel, pt requests to do US and labs in 6 months (without visit), then US/labs/visit due yearly 9/2021  Pt instructed to message me through [facility] once local EGD and also local US/labs done so we can review results   Given paper orders for local labs/US in March  Follow up in about 1 year (around 9/8/2021). with labs and US same day before appt      Thank you for allowing me to participate in the care  of Jazzy Garcia NP-C

## 2020-09-08 NOTE — PATIENT INSTRUCTIONS
1. Recommend Hep A and B vaccine if you have not received the 3 vaccine series recently, see below, can ask your PCP locally to arrange   2. Arrange EGD with local GI  3. US and labs in March, follow up visit in Sept with US and labs same day. Message me through [facility] when you leave the facility locally that does your US and labs in March    FATTY LIVER  There is no FDA approved therapy for fatty liver disease. Therefore, these things are important:  1. No alcohol consumption  2 Weight loss goal of 40 lbs, referral for [facility] if interested. Also, if interested in a dietician visit to create a weight loss plan, contact the dietician team at [facility] at [email or web address] to schedule a visit to you can call [facility] in [place]: [phone] and the  will transfer the call to one of the dieticians to schedule an appointment  3. Low carb/sugar, high fiber and protein diet.Try to limit your carb intake to LESS than 30 grams of carbs with a meal or LESS than 5-10 grams with any snack (total of any snack foods eaten during that time). Use [employer]le to add up your carbs through the day. Do NOT drink any beverages with calories or carbs (this can lead to high blood sugar and weight gain). Also, some of our patients have been very successful with weight loss using the pre made/planned meal planning services that limit calories and portion size (one example is Sensible Meals but there are many out there)  4. Exercise, 5 days per week, 30 minutes per day, as tolerated  5. Recommend good cholesterol, blood pressure, blood sugar levels   6. Consider enrolling in ARAYA fibrosis clinical trails since your fibroscan suggested that you may have fibrosis/scarring in your liver related to fatty liver      In some people, fatty liver can progress to steatohepatitis (inflamatory fatty liver) and possibly to cirrhosis, putting one at increased risk for liver cancer, liver  failure, and death. Therefore, the lifestyle changes are very important to decrease this risk.     Website with information about fatty liver and inflammation related to fatty liver (ARAYA) = [email or web address] [email or web address]  AND www.NASHactually.com    CIRRHOSIS  Because you have cirrhosis, it is important to attend clinic visits every 6 months with an Ultrasound and blood tests every 6 months to screen for liver cancer (you are at risk of developing liver cancer due to scar tissue in the liver)    Signs and symptoms of worsening liver disease include jaundice, fluid in the belly (ascites), and confusion/disorientation/slowed thought processes due to hepatic encephalopathy (toxins building up because of liver problems).   You should seek medical attention if any of these things occur.    Also, possible bleeding from esophageal varices (blood vessels in the stomach and foodpipe can burst and cause fatal bleeding).  Therefore, if you have symptoms of vomiting blood, blood in your stool, dark or black stools or vomiting coffee ground vomit, YOU SHOULD GO TO THE EMERGENCY ROOM IMMEDIATELY.     Cirrhosis can increase the risk of liver cancer, liver failure, and death. However, we will watch your liver function score (MELD score) closely with each clinic visit. A normal MELD score is 6, highest is 40. Your last one was an 6 in March (waiting for labs today). We will check this with every clinic visit. A MELD 15 or higher is when we start to consider transplant because MELD 15 or higher indicates that the liver is not functioning as well     Cirrhosis Counseling  - NO alcohol use (includes beer, wine, and/or liquor)  - avoid non-steroidal anti-inflammatory drugs (NSAIDs) such as ibuprofen, Motrin, naprosyn, Alleve due to the risk of kidney damage  - can take acetaminophen (Tylenol), no more than 2000 mg per day  - low sodium (salt) 2 gram per day diet  - high protein diet: 120 grams per day to prevent muscle mass loss. Drink at least 1  protein shake daily (Premier Protein is best option because it is very high protein and low sugar). Ok to use this as nighttime snack to fit it in   - resistance exercises for muscle strength  - avoid raw seafoods due to the risk of fatal Vibrio vulnificus infection  - ultrasound of the liver every 6 months for liver cancer screening (you are at risk of developing liver cancer due to scar tissue in the liver)  - Upper endoscopy every 1-2 years to screen for varices in the stomach and foodpipe which can burst and cause fatal bleeding        HEP A/B IMMUNITY  Your immunity markers show that you do NOT have immunity against Hepatitis A or B, so I recommend that you receive the combination Hepatitis A and B vaccine called TwinRix. This will protect your liver from these viruses, which can make your liver very sick.     Hep A can be transmitted through food and water and can cause significant liver injury. There is a Hepatitis A outbreak in [place] currently. Hep B vaccine is transmitted through blood or bodily fluids (there are no symptoms typically) and can develop longstanding (chronic) Hep B and there is no cure, so many people have it for life. Therefore, the vaccines provide immunity against these viruses that can cause harm to the liver.     The vaccine series is 3 vaccines: one now, one at 4 weeks and one 6 months after the 1st one.

## 2021-01-06 ENCOUNTER — PATIENT MESSAGE (OUTPATIENT)
Dept: HEPATOLOGY | Facility: CLINIC | Age: 46
End: 2021-01-06

## 2021-02-26 LAB
ALBUMIN SERPL-MCNC: 4.4 G/DL (ref 3.8–4.8)
ALBUMIN/GLOB SERPL: 1.8 {RATIO} (ref 1.2–2.2)
ALP SERPL-CCNC: 82 IU/L (ref 39–117)
ALT SERPL-CCNC: 17 IU/L (ref 0–32)
AST SERPL-CCNC: 17 IU/L (ref 0–40)
BILIRUB SERPL-MCNC: 0.3 MG/DL (ref 0–1.2)
BUN SERPL-MCNC: 14 MG/DL (ref 6–24)
BUN/CREAT SERPL: 20 (ref 9–23)
CALCIUM SERPL-MCNC: 9.3 MG/DL (ref 8.7–10.2)
CHLORIDE SERPL-SCNC: 101 MMOL/L (ref 96–106)
CO2 SERPL-SCNC: 22 MMOL/L (ref 20–29)
CREAT SERPL-MCNC: 0.7 MG/DL (ref 0.57–1)
ERYTHROCYTE [DISTWIDTH] IN BLOOD BY AUTOMATED COUNT: 12.2 % (ref 11.7–15.4)
GLOBULIN SER CALC-MCNC: 2.4 G/DL (ref 1.5–4.5)
GLUCOSE SERPL-MCNC: 139 MG/DL (ref 65–99)
HCT VFR BLD AUTO: 43.6 % (ref 34–46.6)
HGB BLD-MCNC: 14.8 G/DL (ref 11.1–15.9)
INR PPP: 0.9 (ref 0.9–1.2)
MCH RBC QN AUTO: 31.4 PG (ref 26.6–33)
MCHC RBC AUTO-ENTMCNC: 33.9 G/DL (ref 31.5–35.7)
MCV RBC AUTO: 92 FL (ref 79–97)
PLATELET # BLD AUTO: 242 X10E3/UL (ref 150–450)
POTASSIUM SERPL-SCNC: 6 MMOL/L (ref 3.5–5.2)
PROT SERPL-MCNC: 6.8 G/DL (ref 6–8.5)
PROTHROMBIN TIME: 9.8 SEC (ref 9.1–12)
RBC # BLD AUTO: 4.72 X10E6/UL (ref 3.77–5.28)
SODIUM SERPL-SCNC: 139 MMOL/L (ref 134–144)
WBC # BLD AUTO: 5.5 X10E3/UL (ref 3.4–10.8)

## 2021-02-28 LAB
AFP-TM SERPL-MCNC: 2.2 NG/ML (ref 0–8.3)
SPECIMEN STATUS REPORT: NORMAL

## 2021-03-02 ENCOUNTER — TELEPHONE (OUTPATIENT)
Dept: HEPATOLOGY | Facility: CLINIC | Age: 46
End: 2021-03-02

## 2021-03-03 ENCOUNTER — PATIENT MESSAGE (OUTPATIENT)
Dept: HEPATOLOGY | Facility: CLINIC | Age: 46
End: 2021-03-03

## 2021-03-16 ENCOUNTER — PATIENT MESSAGE (OUTPATIENT)
Dept: HEPATOLOGY | Facility: CLINIC | Age: 46
End: 2021-03-16

## 2021-03-17 ENCOUNTER — PATIENT MESSAGE (OUTPATIENT)
Dept: HEPATOLOGY | Facility: CLINIC | Age: 46
End: 2021-03-17

## 2021-03-17 ENCOUNTER — DOCUMENTATION ONLY (OUTPATIENT)
Dept: HEPATOLOGY | Facility: CLINIC | Age: 46
End: 2021-03-17

## 2021-03-17 ENCOUNTER — TELEPHONE (OUTPATIENT)
Dept: HEPATOLOGY | Facility: CLINIC | Age: 46
End: 2021-03-17

## 2021-03-17 DIAGNOSIS — K75.81 LIVER CIRRHOSIS SECONDARY TO NASH: Primary | ICD-10-CM

## 2021-03-17 DIAGNOSIS — K74.60 LIVER CIRRHOSIS SECONDARY TO NASH: Primary | ICD-10-CM

## 2021-08-04 ENCOUNTER — PATIENT MESSAGE (OUTPATIENT)
Dept: HEPATOLOGY | Facility: CLINIC | Age: 46
End: 2021-08-04

## 2021-09-15 ENCOUNTER — HOSPITAL ENCOUNTER (OUTPATIENT)
Dept: RADIOLOGY | Facility: HOSPITAL | Age: 46
Discharge: HOME | End: 2021-09-15
Attending: NURSE PRACTITIONER
Payer: COMMERCIAL

## 2021-09-15 ENCOUNTER — OFFICE VISIT (OUTPATIENT)
Dept: HEPATOLOGY | Facility: CLINIC | Age: 46
End: 2021-09-15
Payer: COMMERCIAL

## 2021-09-15 VITALS
SYSTOLIC BLOOD PRESSURE: 125 MMHG | TEMPERATURE: 98 F | OXYGEN SATURATION: 99 % | RESPIRATION RATE: 18 BRPM | HEIGHT: 66 IN | HEART RATE: 83 BPM | DIASTOLIC BLOOD PRESSURE: 74 MMHG | WEIGHT: 215.63 LBS | BODY MASS INDEX: 34.65 KG/M2

## 2021-09-15 DIAGNOSIS — K75.81 LIVER CIRRHOSIS SECONDARY TO NASH: Primary | ICD-10-CM

## 2021-09-15 DIAGNOSIS — E78.49 OTHER HYPERLIPIDEMIA: ICD-10-CM

## 2021-09-15 DIAGNOSIS — E11.9 TYPE 2 DIABETES MELLITUS WITHOUT COMPLICATION, WITHOUT LONG-TERM CURRENT USE OF INSULIN: ICD-10-CM

## 2021-09-15 DIAGNOSIS — K75.81 LIVER CIRRHOSIS SECONDARY TO NASH: ICD-10-CM

## 2021-09-15 DIAGNOSIS — I10 ESSENTIAL HYPERTENSION: ICD-10-CM

## 2021-09-15 DIAGNOSIS — K74.60 LIVER CIRRHOSIS SECONDARY TO NASH: ICD-10-CM

## 2021-09-15 DIAGNOSIS — E66.9 OBESITY (BMI 30-39.9): ICD-10-CM

## 2021-09-15 DIAGNOSIS — K74.60 LIVER CIRRHOSIS SECONDARY TO NASH: Primary | ICD-10-CM

## 2021-09-15 PROCEDURE — 99214 PR OFFICE/OUTPT VISIT, EST, LEVL IV, 30-39 MIN: ICD-10-PCS | Mod: S$GLB,,, | Performed by: NURSE PRACTITIONER

## 2021-09-15 PROCEDURE — 99999 PR PBB SHADOW E&M-EST. PATIENT-LVL IV: ICD-10-PCS | Mod: PBBFAC,,, | Performed by: NURSE PRACTITIONER

## 2021-09-15 PROCEDURE — 4010F ACE/ARB THERAPY RXD/TAKEN: CPT | Mod: CPTII,S$GLB,, | Performed by: NURSE PRACTITIONER

## 2021-09-15 PROCEDURE — 3074F PR MOST RECENT SYSTOLIC BLOOD PRESSURE < 130 MM HG: ICD-10-PCS | Mod: CPTII,S$GLB,, | Performed by: NURSE PRACTITIONER

## 2021-09-15 PROCEDURE — 3078F DIAST BP <80 MM HG: CPT | Mod: CPTII,S$GLB,, | Performed by: NURSE PRACTITIONER

## 2021-09-15 PROCEDURE — 3008F BODY MASS INDEX DOCD: CPT | Mod: CPTII,S$GLB,, | Performed by: NURSE PRACTITIONER

## 2021-09-15 PROCEDURE — 3008F PR BODY MASS INDEX (BMI) DOCUMENTED: ICD-10-PCS | Mod: CPTII,S$GLB,, | Performed by: NURSE PRACTITIONER

## 2021-09-15 PROCEDURE — 1159F PR MEDICATION LIST DOCUMENTED IN MEDICAL RECORD: ICD-10-PCS | Mod: CPTII,S$GLB,, | Performed by: NURSE PRACTITIONER

## 2021-09-15 PROCEDURE — 3074F SYST BP LT 130 MM HG: CPT | Mod: CPTII,S$GLB,, | Performed by: NURSE PRACTITIONER

## 2021-09-15 PROCEDURE — 4010F PR ACE/ARB THEARPY RXD/TAKEN: ICD-10-PCS | Mod: CPTII,S$GLB,, | Performed by: NURSE PRACTITIONER

## 2021-09-15 PROCEDURE — 76705 ECHO EXAM OF ABDOMEN: CPT | Mod: TC

## 2021-09-15 PROCEDURE — 76705 ECHO EXAM OF ABDOMEN: CPT | Mod: 26,,, | Performed by: RADIOLOGY

## 2021-09-15 PROCEDURE — 99999 PR PBB SHADOW E&M-EST. PATIENT-LVL IV: CPT | Mod: PBBFAC,,, | Performed by: NURSE PRACTITIONER

## 2021-09-15 PROCEDURE — 1160F PR REVIEW ALL MEDS BY PRESCRIBER/CLIN PHARMACIST DOCUMENTED: ICD-10-PCS | Mod: CPTII,S$GLB,, | Performed by: NURSE PRACTITIONER

## 2021-09-15 PROCEDURE — 1159F MED LIST DOCD IN RCRD: CPT | Mod: CPTII,S$GLB,, | Performed by: NURSE PRACTITIONER

## 2021-09-15 PROCEDURE — 76705 US ABDOMEN LIMITED: ICD-10-PCS | Mod: 26,,, | Performed by: RADIOLOGY

## 2021-09-15 PROCEDURE — 3078F PR MOST RECENT DIASTOLIC BLOOD PRESSURE < 80 MM HG: ICD-10-PCS | Mod: CPTII,S$GLB,, | Performed by: NURSE PRACTITIONER

## 2021-09-15 PROCEDURE — 99214 OFFICE O/P EST MOD 30 MIN: CPT | Mod: S$GLB,,, | Performed by: NURSE PRACTITIONER

## 2021-09-15 PROCEDURE — 1160F RVW MEDS BY RX/DR IN RCRD: CPT | Mod: CPTII,S$GLB,, | Performed by: NURSE PRACTITIONER

## 2021-09-15 RX ORDER — SEMAGLUTIDE 1.34 MG/ML
0.5 INJECTION, SOLUTION SUBCUTANEOUS
COMMUNITY
Start: 2021-08-25 | End: 2023-10-13

## 2022-04-18 ENCOUNTER — OFFICE VISIT (OUTPATIENT)
Dept: HEPATOLOGY | Facility: CLINIC | Age: 47
End: 2022-04-18
Payer: COMMERCIAL

## 2022-04-18 ENCOUNTER — HOSPITAL ENCOUNTER (OUTPATIENT)
Dept: RADIOLOGY | Facility: HOSPITAL | Age: 47
Discharge: HOME | End: 2022-04-18
Attending: NURSE PRACTITIONER
Payer: COMMERCIAL

## 2022-04-18 VITALS
TEMPERATURE: 98 F | HEART RATE: 94 BPM | OXYGEN SATURATION: 98 % | WEIGHT: 217.38 LBS | HEIGHT: 66 IN | DIASTOLIC BLOOD PRESSURE: 75 MMHG | RESPIRATION RATE: 18 BRPM | BODY MASS INDEX: 34.93 KG/M2 | SYSTOLIC BLOOD PRESSURE: 134 MMHG

## 2022-04-18 DIAGNOSIS — K74.60 LIVER CIRRHOSIS SECONDARY TO NASH: Primary | ICD-10-CM

## 2022-04-18 DIAGNOSIS — K75.81 LIVER CIRRHOSIS SECONDARY TO NASH: Primary | ICD-10-CM

## 2022-04-18 DIAGNOSIS — E78.49 OTHER HYPERLIPIDEMIA: ICD-10-CM

## 2022-04-18 DIAGNOSIS — E66.9 OBESITY (BMI 30-39.9): ICD-10-CM

## 2022-04-18 DIAGNOSIS — K75.81 LIVER CIRRHOSIS SECONDARY TO NASH: ICD-10-CM

## 2022-04-18 DIAGNOSIS — K74.60 LIVER CIRRHOSIS SECONDARY TO NASH: ICD-10-CM

## 2022-04-18 DIAGNOSIS — I10 ESSENTIAL HYPERTENSION: ICD-10-CM

## 2022-04-18 DIAGNOSIS — E11.9 TYPE 2 DIABETES MELLITUS WITHOUT COMPLICATION, WITHOUT LONG-TERM CURRENT USE OF INSULIN: ICD-10-CM

## 2022-04-18 PROCEDURE — 3078F DIAST BP <80 MM HG: CPT | Mod: CPTII,S$GLB,, | Performed by: NURSE PRACTITIONER

## 2022-04-18 PROCEDURE — 76705 ECHO EXAM OF ABDOMEN: CPT | Mod: TC

## 2022-04-18 PROCEDURE — 99999 PR PBB SHADOW E&M-EST. PATIENT-LVL IV: CPT | Mod: PBBFAC,,, | Performed by: NURSE PRACTITIONER

## 2022-04-18 PROCEDURE — 99214 OFFICE O/P EST MOD 30 MIN: CPT | Mod: S$GLB,,, | Performed by: NURSE PRACTITIONER

## 2022-04-18 PROCEDURE — 3075F PR MOST RECENT SYSTOLIC BLOOD PRESS GE 130-139MM HG: ICD-10-PCS | Mod: CPTII,S$GLB,, | Performed by: NURSE PRACTITIONER

## 2022-04-18 PROCEDURE — 76705 ECHO EXAM OF ABDOMEN: CPT | Mod: 26,,, | Performed by: RADIOLOGY

## 2022-04-18 PROCEDURE — 99214 PR OFFICE/OUTPT VISIT, EST, LEVL IV, 30-39 MIN: ICD-10-PCS | Mod: S$GLB,,, | Performed by: NURSE PRACTITIONER

## 2022-04-18 PROCEDURE — 76705 US ABDOMEN LIMITED: ICD-10-PCS | Mod: 26,,, | Performed by: RADIOLOGY

## 2022-04-18 PROCEDURE — 3008F BODY MASS INDEX DOCD: CPT | Mod: CPTII,S$GLB,, | Performed by: NURSE PRACTITIONER

## 2022-04-18 PROCEDURE — 1160F RVW MEDS BY RX/DR IN RCRD: CPT | Mod: CPTII,S$GLB,, | Performed by: NURSE PRACTITIONER

## 2022-04-18 PROCEDURE — 1160F PR REVIEW ALL MEDS BY PRESCRIBER/CLIN PHARMACIST DOCUMENTED: ICD-10-PCS | Mod: CPTII,S$GLB,, | Performed by: NURSE PRACTITIONER

## 2022-04-18 PROCEDURE — 3075F SYST BP GE 130 - 139MM HG: CPT | Mod: CPTII,S$GLB,, | Performed by: NURSE PRACTITIONER

## 2022-04-18 PROCEDURE — 99999 PR PBB SHADOW E&M-EST. PATIENT-LVL IV: ICD-10-PCS | Mod: PBBFAC,,, | Performed by: NURSE PRACTITIONER

## 2022-04-18 PROCEDURE — 3078F PR MOST RECENT DIASTOLIC BLOOD PRESSURE < 80 MM HG: ICD-10-PCS | Mod: CPTII,S$GLB,, | Performed by: NURSE PRACTITIONER

## 2022-04-18 PROCEDURE — 1159F MED LIST DOCD IN RCRD: CPT | Mod: CPTII,S$GLB,, | Performed by: NURSE PRACTITIONER

## 2022-04-18 PROCEDURE — 1159F PR MEDICATION LIST DOCUMENTED IN MEDICAL RECORD: ICD-10-PCS | Mod: CPTII,S$GLB,, | Performed by: NURSE PRACTITIONER

## 2022-04-18 PROCEDURE — 3008F PR BODY MASS INDEX (BMI) DOCUMENTED: ICD-10-PCS | Mod: CPTII,S$GLB,, | Performed by: NURSE PRACTITIONER

## 2022-04-18 NOTE — PATIENT INSTRUCTIONS
This is a web site that you may find helpful about cirrhosis : [email or web address].ca/    Because you have cirrhosis, it is important to attend clinic visits every 6 months with an Ultrasound and blood tests every 6 months to screen for liver cancer (you are at risk of developing liver cancer due to scar tissue in the liver)    Signs and symptoms of worsening liver disease include jaundice, fluid in the belly (ascites), and confusion/disorientation/slowed thought processes due to hepatic encephalopathy (toxins building up because of liver problems).   You should seek medical attention if any of these things occur.    Also, possible bleeding from esophageal varices (blood vessels in the stomach and foodpipe can burst and cause fatal bleeding).  Therefore, if you have symptoms of vomiting blood, blood in your stool, dark or black stools or vomiting coffee ground vomit, YOU SHOULD GO TO THE EMERGENCY ROOM IMMEDIATELY.     Cirrhosis can increase the risk of liver cancer, liver failure, and death. However, we will watch your liver function score (MELD score) closely with each clinic visit. A normal MELD score is 6, highest is 40. Your last one was an 6. We will check this with every clinic visit. A MELD 15 or higher is when we start to consider transplant because MELD 15 or higher indicates that the liver is not functioning as well     Cirrhosis Counseling  - NO alcohol use (includes beer, wine, and/or liquor)  - avoid non-steroidal anti-inflammatory drugs (NSAIDs) such as ibuprofen, Motrin, naprosyn, Alleve due to the risk of kidney damage  - can take acetaminophen (Tylenol), no more than 2000 mg per day  - low sodium (salt) 2 gram per day diet  - high protein diet: 110 grams per day to prevent muscle mass loss. Drink at least 1 protein shake daily (Premier Protein is best option because it is very high protein and low sugar). Ok to use this as nighttime snack to fit it in   - resistance exercises for muscle  strength  - avoid raw seafoods due to the risk of fatal Vibrio vulnificus infection  - ultrasound of the liver every 6 months for liver cancer screening (you are at risk of developing liver cancer due to scar tissue in the liver)  - Upper endoscopy every 1-2 years to screen for varices in the stomach and foodpipe which can burst and cause fatal bleeding

## 2022-04-18 NOTE — PROGRESS NOTES
[facility] Established Patient Visit    Reason for Visit:  Cirrhosis     PCP: Gildardo Badillo MD (Inactive)    HPI:  This is a 46 y.o. female with PMH noted below, here for follow up of Well-compensated cirrhosis due to ARAYA     Diagnosis of cirrhosis based on Biopsy done 4/2019 at outside facility - noted ARAYA, cirrhosis     Previous serologic w/u was negative for Dylan's, alpha-1 antitrypsin deficiency, hemochromatosis, autoimmune etiology, and viral hepatitis B and C     Risk factors for NAFLD include obesity, HTN, HLD, DM, PCOS     Interval HPI: Presents today with significant other. No s/s of hepatic decompensation: no ascites, HE or h/o variceal bleeding  Due for EGD, gets at outside facility, pt reminded to contact them to schedule  No weight loss since last visit, currently ~217 lbs. No following any dietary restrictions, plans to restart low carb     Abd U/S done 4/2022 showed no lesions, + splenomegaly    Lab Results   Component Value Date    ALT 15 04/18/2022    AST 14 04/18/2022    ALKPHOS 79 04/18/2022    BILITOT 0.2 04/18/2022    ALBUMIN 3.7 04/18/2022    INR 0.9 04/18/2022     04/18/2022     MELD-Na score: 6 at 4/18/2022  8:39 AM  MELD score: 6 at 4/18/2022  8:39 AM  Calculated from:  Serum Creatinine: 0.8 mg/dL (Using min of 1 mg/dL) at 4/18/2022  8:39 AM  Serum Sodium: 140 mmol/L (Using max of 137 mmol/L) at 4/18/2022  8:39 AM  Total Bilirubin: 0.2 mg/dL (Using min of 1 mg/dL) at 4/18/2022  8:39 AM  INR(ratio): 0.9 (Using min of 1) at 4/18/2022  8:39 AM  Age: 46 years  MELD has been low, indicating no benefit to liver transplant currently      Cirrhosis Health Maintenance:   -- Last EGD : due, pt will perform with local GI, pt reminded its overdue, pt aware  -- HCC screening              U/S  no lesions, next due 10/2022              AFP  WNL, next due 10/2022  Lab Results   Component Value Date    AFP <2.0 04/18/2022     -- Immunity to Hep A and B - needs TwinRix, reminded  pt, she will obtain through PCP     Denies family history of liver disease . Denies current alcohol consumption  or history of significant alcohol consumption in past     PMHX:  has a past medical history of Diabetes, Essential hypertension (6/10/2019), Gastroparesis, Liver cirrhosis secondary to ARAYA (6/18/2019), Obesity (BMI 30-39.9) (9/8/2020), Other hyperlipidemia (6/10/2019), PCOS (polycystic ovarian syndrome), and Type 2 diabetes mellitus (6/10/2019).    PSHX:  has a past surgical history that includes Cholecystectomy (2000).    The patient's social and family histories were reviewed by me and updated in the appropriate section of the electronic medical record.    Review of patient's allergies indicates:  No Known Allergies    Current Outpatient Medications on File Prior to Visit   Medication Sig Dispense Refill    atorvastatin (LIPITOR) 20 MG tablet Take 20 mg by mouth once daily.  1    cholecalciferol, vitamin D3, 50,000 unit capsule Take 50,000 Units by mouth every 30 days.  1    ELURYNG 0.12-0.015 mg/24 hr vaginal ring       lisinopril (PRINIVIL,ZESTRIL) 2.5 MG tablet Take 2.5 mg by mouth once daily.  3    OZEMPIC 0.25 mg or 0.5 mg(2 mg/1.5 mL) pen injector Inject 0.5 mg into the skin every 7 days.      SYNJARDY XR 25-1,000 mg TBph Take 2 tablets by mouth once daily.  1     No current facility-administered medications on file prior to visit.       SOCIAL HISTORY:   Social History     Substance and Sexual Activity   Alcohol Use Not Currently       Social History     Substance and Sexual Activity   Drug Use Never       ROS:   GENERAL: Denies fatigue  CARDIOVASCULAR: Denies edema  GI: Denies abdominal pain  SKIN: Denies rash, itching   NEURO: Denies confusion, memory loss, or mood changes    Objective Findings:    PHYSICAL EXAM:   Friendly White female, in no acute distress; alert and oriented to person, place and time  VITALS: /75 (BP Location: Right arm, Patient Position: Sitting, BP Method:  "Medium (Automatic))   Pulse 94   Temp 98 °F (36.7 °C) (Oral)   Resp 18   Ht 5' 6" (1.676 m)   Wt 98.6 kg (217 lb 6 oz)   SpO2 98%   BMI 35.09 kg/m²   EYES: Sclerae anicteric  GI: Soft, non-tender, non-distended. No ascites.  EXTREMITIES:  No edema.  SKIN: Warm and dry. No jaundice. No telangectasias noted. No palmar erythema.  NEURO:  No asterixis.  PSYCH:  Thought and speech pattern appropriate. Behavior normal      EDUCATION:  See instructions discussed with patient in Instructions section of the After Visit Summary       ASSESSMENT & PLAN:  46 y.o. White female with:  1.  Cirrhosis, due to ARAYA, well compensated  --- cirrhosis diagnosis based on ARAYA  -- MELD-Na score: 6 at 4/18/2022  8:39 AM  MELD score: 6 at 4/18/2022  8:39 AM  Calculated from:  Serum Creatinine: 0.8 mg/dL (Using min of 1 mg/dL) at 4/18/2022  8:39 AM  Serum Sodium: 140 mmol/L (Using max of 137 mmol/L) at 4/18/2022  8:39 AM  Total Bilirubin: 0.2 mg/dL (Using min of 1 mg/dL) at 4/18/2022  8:39 AM  INR(ratio): 0.9 (Using min of 1) at 4/18/2022  8:39 AM  Age: 46 years  -- HCC screening: AFP and abd. U/S.. U/S showed no lesions, AFP WNL - both next due 10/2022  -- Immunity to Hep A and B - recommend TwinRix, pt will obtain through outside PCP, pt reminded   -- EGD due, pt will obtain through outside/local GI - pt reminded   --- Previous serologic w/u was negative for Dylan's, alpha-1 antitrypsin deficiency, hemochromatosis, autoimmune etiology, and viral hepatitis B and C.   -- Cirrhosis counseling as noted above and discussed with patient. Discussed with patient that do not recommend any elective abdominal surgery due to risk of hepatic decompensation.      2.  Fatty liver  -- risk factors for fatty liver: obesity, HTN, HLD, DM, PCOS  Recommend:  1. Weight loss goal of 40 lbs  2. Low carb/sugar, high fiber and protein diet  3. Exercise, 5 days per week, 30 minutes per day, as tolerated  4. Recommend good cholesterol, blood pressure, blood " sugar levels   5. Consider enrolling in ARAYA fibrosis clinical trails since your fibroscan suggested that you may have fibrosis/scarring in your liver related to fatty liver  - pt interested, will message to add to research list          Follow up in about 6 months (around 10/18/2022). with US and labs same day due to travel     Thank you for allowing me to participate in the care of Jazzy Hobson KITA LuaC    I spent a total of 30 minutes on the day of the visit.This includes face to face time and non-face to face time preparing to see the patient (eg, review of tests), obtaining and/or reviewing separately obtained history, documenting clinical information in the electronic or other health record, independently interpreting results and communicating results to the patient/family/caregiver, and coordinating care.       CC'ed note to:

## 2022-06-13 ENCOUNTER — PATIENT MESSAGE (OUTPATIENT)
Dept: INFECTIOUS DISEASES | Facility: CLINIC | Age: 47
End: 2022-06-13
Payer: COMMERCIAL

## 2022-06-30 ENCOUNTER — RESEARCH ENCOUNTER (OUTPATIENT)
Dept: RESEARCH | Facility: HOSPITAL | Age: 47
End: 2022-06-30
Payer: COMMERCIAL

## 2022-06-30 ENCOUNTER — PATIENT MESSAGE (OUTPATIENT)
Dept: INFECTIOUS DISEASES | Facility: CLINIC | Age: 47
End: 2022-06-30
Payer: COMMERCIAL

## 2022-06-30 NOTE — PROGRESS NOTES
RESEARCH STUDY CONSENT ENCOUNTER  ORGAN TRANSPLANT  [facility] BAKARI KEVIN    Study Title: Role of Tumor-Induced Immune Tolerance in the Patient Response to Locoregional Therapy: Implications in Assessment Risk of Hepatocellular Carcinoma Recurrence Following Liver Transplantation    IRB #: 2016.131.B    IRB Approval Date: 6/8/2016    : Wilfredo Bhandari MD  Sub-investigator: Mirza Rosales, PhD    Patient Number: to be assigned    Patient is scheduled for labs on October 21, 2022.     This study is an observational study to evaluate patients receiving transarterial chemoembolization (TACE) or transarterial radioembolization (TARE) therapy to define the link between tumor-elicited peripheral cell populations, and the risk of hepatocellular carcinoma (HCC) recurrence before orthotopic liver transplantation (OLT). [IRB 2016.131.B] Patient is being consented as a control for this study and participation in this study will end after specimen collection. This patient is NOT undergoing TACE or TARE, and DOES NOT have HCC.     Present for discussion: patient Jazzy Borges, consenter Denae Orosco, witness Kasey Chaves  Is LAR Consenting for Subject: NO    Prior to the Informed Consent (IC) being signed, or any protocol required testing, procedure, or intervention being performed, the following was done or discussed with Jazzy Borges:    Purpose of the Study, Qualifications to Participate: YES  Study Design, Schedule and Procedures: YES  Risks, Benefits, Alternative Treatments, Compensation and Costs: YES  Confidentiality and HIPAA Authorization for Release of Medical Records for the research trial/subject's right/study related injury: YES  Study related contact information: YES  Voluntary Participation and Withdrawal from the research trial at any time: YES  Patient has been offered the opportunity to ask questions regarding the study and all questions were answered satisfactorily: YES  Patient verbalizes  understanding of the study/procedures and agrees to participate: YES  CRC and PI contact information given to patient:YES  Verification the ICF was appropriately completed: YES  Signed copy given to patient: YES  Copy in patient's chart and original uploaded to [facility]: YES    Research staff present during consent: Kasey Lowe      No study procedures (I.e. specimen collection) were performed before the informed consent was signed.     In accordance with the study protocol, Research Lab orders will be placed and specimens will be collected on October 21, 2022 in:  [facility] VNP: NO  [facility] LABTX: NO  [facility] IM: YES    Denae Orosco  Admin Research- Liver Transplant

## 2022-09-21 ENCOUNTER — PATIENT MESSAGE (OUTPATIENT)
Dept: HEPATOLOGY | Facility: CLINIC | Age: 47
End: 2022-09-21
Payer: COMMERCIAL

## 2022-10-05 DIAGNOSIS — Z00.6 RESEARCH STUDY PATIENT: Primary | ICD-10-CM

## 2022-10-21 ENCOUNTER — OFFICE VISIT (OUTPATIENT)
Dept: HEPATOLOGY | Facility: CLINIC | Age: 47
End: 2022-10-21
Payer: COMMERCIAL

## 2022-10-21 ENCOUNTER — HOSPITAL ENCOUNTER (OUTPATIENT)
Dept: RADIOLOGY | Facility: HOSPITAL | Age: 47
Discharge: HOME | End: 2022-10-21
Attending: NURSE PRACTITIONER
Payer: COMMERCIAL

## 2022-10-21 ENCOUNTER — RESEARCH ENCOUNTER (OUTPATIENT)
Dept: RESEARCH | Facility: HOSPITAL | Age: 47
End: 2022-10-21
Payer: COMMERCIAL

## 2022-10-21 VITALS
WEIGHT: 225.06 LBS | DIASTOLIC BLOOD PRESSURE: 66 MMHG | RESPIRATION RATE: 18 BRPM | HEART RATE: 87 BPM | HEIGHT: 66 IN | TEMPERATURE: 99 F | BODY MASS INDEX: 36.17 KG/M2 | SYSTOLIC BLOOD PRESSURE: 121 MMHG | OXYGEN SATURATION: 92 %

## 2022-10-21 DIAGNOSIS — E78.49 OTHER HYPERLIPIDEMIA: ICD-10-CM

## 2022-10-21 DIAGNOSIS — K75.81 LIVER CIRRHOSIS SECONDARY TO NASH: Primary | ICD-10-CM

## 2022-10-21 DIAGNOSIS — K75.81 LIVER CIRRHOSIS SECONDARY TO NASH: ICD-10-CM

## 2022-10-21 DIAGNOSIS — E66.9 OBESITY (BMI 30-39.9): ICD-10-CM

## 2022-10-21 DIAGNOSIS — K74.60 LIVER CIRRHOSIS SECONDARY TO NASH: Primary | ICD-10-CM

## 2022-10-21 DIAGNOSIS — E11.9 TYPE 2 DIABETES MELLITUS WITHOUT COMPLICATION, WITHOUT LONG-TERM CURRENT USE OF INSULIN: ICD-10-CM

## 2022-10-21 DIAGNOSIS — K74.60 LIVER CIRRHOSIS SECONDARY TO NASH: ICD-10-CM

## 2022-10-21 DIAGNOSIS — I10 ESSENTIAL HYPERTENSION: ICD-10-CM

## 2022-10-21 PROCEDURE — 99999 PR PBB SHADOW E&M-EST. PATIENT-LVL V: ICD-10-PCS | Mod: PBBFAC,,, | Performed by: NURSE PRACTITIONER

## 2022-10-21 PROCEDURE — 3078F PR MOST RECENT DIASTOLIC BLOOD PRESSURE < 80 MM HG: ICD-10-PCS | Mod: CPTII,S$GLB,, | Performed by: NURSE PRACTITIONER

## 2022-10-21 PROCEDURE — 3074F SYST BP LT 130 MM HG: CPT | Mod: CPTII,S$GLB,, | Performed by: NURSE PRACTITIONER

## 2022-10-21 PROCEDURE — 99999 PR PBB SHADOW E&M-EST. PATIENT-LVL V: CPT | Mod: PBBFAC,,, | Performed by: NURSE PRACTITIONER

## 2022-10-21 PROCEDURE — 99214 OFFICE O/P EST MOD 30 MIN: CPT | Mod: S$GLB,,, | Performed by: NURSE PRACTITIONER

## 2022-10-21 PROCEDURE — 3078F DIAST BP <80 MM HG: CPT | Mod: CPTII,S$GLB,, | Performed by: NURSE PRACTITIONER

## 2022-10-21 PROCEDURE — 1160F PR REVIEW ALL MEDS BY PRESCRIBER/CLIN PHARMACIST DOCUMENTED: ICD-10-PCS | Mod: CPTII,S$GLB,, | Performed by: NURSE PRACTITIONER

## 2022-10-21 PROCEDURE — 3074F PR MOST RECENT SYSTOLIC BLOOD PRESSURE < 130 MM HG: ICD-10-PCS | Mod: CPTII,S$GLB,, | Performed by: NURSE PRACTITIONER

## 2022-10-21 PROCEDURE — 1159F PR MEDICATION LIST DOCUMENTED IN MEDICAL RECORD: ICD-10-PCS | Mod: CPTII,S$GLB,, | Performed by: NURSE PRACTITIONER

## 2022-10-21 PROCEDURE — 76705 ECHO EXAM OF ABDOMEN: CPT | Mod: 26,,, | Performed by: STUDENT IN AN ORGANIZED HEALTH CARE EDUCATION/TRAINING PROGRAM

## 2022-10-21 PROCEDURE — 4010F ACE/ARB THERAPY RXD/TAKEN: CPT | Mod: CPTII,S$GLB,, | Performed by: NURSE PRACTITIONER

## 2022-10-21 PROCEDURE — 4010F PR ACE/ARB THEARPY RXD/TAKEN: ICD-10-PCS | Mod: CPTII,S$GLB,, | Performed by: NURSE PRACTITIONER

## 2022-10-21 PROCEDURE — 99214 PR OFFICE/OUTPT VISIT, EST, LEVL IV, 30-39 MIN: ICD-10-PCS | Mod: S$GLB,,, | Performed by: NURSE PRACTITIONER

## 2022-10-21 PROCEDURE — 76705 US ABDOMEN LIMITED: ICD-10-PCS | Mod: 26,,, | Performed by: STUDENT IN AN ORGANIZED HEALTH CARE EDUCATION/TRAINING PROGRAM

## 2022-10-21 PROCEDURE — 1160F RVW MEDS BY RX/DR IN RCRD: CPT | Mod: CPTII,S$GLB,, | Performed by: NURSE PRACTITIONER

## 2022-10-21 PROCEDURE — 1159F MED LIST DOCD IN RCRD: CPT | Mod: CPTII,S$GLB,, | Performed by: NURSE PRACTITIONER

## 2022-10-21 PROCEDURE — 76705 ECHO EXAM OF ABDOMEN: CPT | Mod: TC

## 2022-10-21 RX ORDER — ESCITALOPRAM OXALATE 10 MG/1
10 TABLET ORAL DAILY
COMMUNITY
Start: 2022-09-27 | End: 2023-10-13

## 2022-10-21 NOTE — PROGRESS NOTES
[facility] Established Patient Visit    Reason for Visit:  Cirrhosis     PCP: Gildardo Badillo MD (Inactive)    HPI:  This is a 46 y.o. 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 No s/s of hepatic decompensation: no ascites, HE or h/o variceal bleeding  Due for EGD, gets at outside facility, pt reminded to contact them to schedule  On Ozempic  No weight loss since last visit, some weight gain, currently ~225 lbs. No following any dietary restrictions, plans to restart low carb     Abd U/S done 10/2022 showed no lesions, + splenomegaly    Lab Results   Component Value Date    ALT 20 10/21/2022    AST 17 10/21/2022    ALKPHOS 95 10/21/2022    BILITOT 0.6 10/21/2022    ALBUMIN 4.3 10/21/2022    INR 1.0 10/21/2022     10/21/2022     MELD-Na score: 6 at 10/21/2022  8:23 AM  MELD score: 6 at 10/21/2022  8:23 AM  Calculated from:  Serum Creatinine: 0.9 mg/dL (Using min of 1 mg/dL) at 10/21/2022  8:23 AM  Serum Sodium: 136 mmol/L at 10/21/2022  8:23 AM  Total Bilirubin: 0.6 mg/dL (Using min of 1 mg/dL) at 10/21/2022  8:23 AM  INR(ratio): 1.0 at 10/21/2022  8:23 AM  Age: 46 years  MELD has been low, indicating no benefit to liver transplant currently      Cirrhosis Health Maintenance:   -- Last EGD : due, pt will perform with local GI, pt reminded its overdue, pt aware  -- HCC screening              U/S  no lesions, next due 4/2023              AFP  WNL, next due 4/2023  Lab Results   Component Value Date    AFP <2.0 04/18/2022     -- Immunity to Hep A and B - needs TwinRix, previously reminded  "pt     Denies family history of liver disease . Denies current alcohol consumption  or history of significant alcohol consumption in past     PMHX:  has a past medical history of Diabetes, Essential hypertension (6/10/2019), Gastroparesis, Liver cirrhosis secondary to ARAYA (6/18/2019), Obesity (BMI 30-39.9) (9/8/2020), Other hyperlipidemia (6/10/2019), PCOS (polycystic ovarian syndrome), and Type 2 diabetes mellitus (6/10/2019).    PSHX:  has a past surgical history that includes Cholecystectomy (2000).    The patient's social and family histories were reviewed by me and updated in the appropriate section of the electronic medical record.    Review of patient's allergies indicates:  No Known Allergies    Current Outpatient Medications on File Prior to Visit   Medication Sig Dispense Refill    atorvastatin (LIPITOR) 20 MG tablet Take 20 mg by mouth once daily.  1    cholecalciferol, vitamin D3, 50,000 unit capsule Take 50,000 Units by mouth every 30 days.  1    ELURYNG 0.12-0.015 mg/24 hr vaginal ring       lisinopril (PRINIVIL,ZESTRIL) 2.5 MG tablet Take 2.5 mg by mouth once daily.  3    OZEMPIC 0.25 mg or 0.5 mg(2 mg/1.5 mL) pen injector Inject 0.5 mg into the skin every 7 days.      SYNJARDY XR 25-1,000 mg TBph Take 2 tablets by mouth once daily.  1     No current facility-administered medications on file prior to visit.       SOCIAL HISTORY:   Social History     Substance and Sexual Activity   Alcohol Use Not Currently       Social History     Substance and Sexual Activity   Drug Use Never       ROS:   GENERAL: Denies fatigue  CARDIOVASCULAR: Denies edema  GI: Denies abdominal pain  SKIN: Denies rash, itching   NEURO: Denies confusion, memory loss, or mood changes    Objective Findings:    PHYSICAL EXAM:   Friendly White female, in no acute distress; alert and oriented to person, place and time  VITALS: /66   Pulse 87   Temp 99.3 °F (37.4 °C) (Oral)   Resp 18   Ht 5' 6" (1.676 m)   Wt 102.1 kg (225 lb " 1.4 oz)   SpO2 (!) 92%   BMI 36.33 kg/m²   EYES: Sclerae anicteric  GI: Soft, non-tender, non-distended. No ascites.  EXTREMITIES:  No edema.  SKIN: Warm and dry. No jaundice. No telangectasias noted. No palmar erythema.  NEURO:  No asterixis.  PSYCH:  Thought and speech pattern appropriate. Behavior normal      EDUCATION:  See instructions discussed with patient in Instructions section of the After Visit Summary       ASSESSMENT & PLAN:  46 y.o. White female with:  1.  Cirrhosis, due to ARAYA, well compensated  --- cirrhosis diagnosis based on ARAYA  -- MELD-Na score: 6 at 10/21/2022  8:23 AM  MELD score: 6 at 10/21/2022  8:23 AM  Calculated from:  Serum Creatinine: 0.9 mg/dL (Using min of 1 mg/dL) at 10/21/2022  8:23 AM  Serum Sodium: 136 mmol/L at 10/21/2022  8:23 AM  Total Bilirubin: 0.6 mg/dL (Using min of 1 mg/dL) at 10/21/2022  8:23 AM  INR(ratio): 1.0 at 10/21/2022  8:23 AM  Age: 46 years  -- HCC screening: AFP and abd. U/S.. U/S showed no lesions, AFP WNL - both next due 4/2023  -- Immunity to Hep A and B - recommend TwinRix, previously reminded pt   -- EGD due, pt will obtain through outside/local GI - pt reminded   --- Previous serologic w/u was negative for Dylan's, alpha-1 antitrypsin deficiency, hemochromatosis, autoimmune etiology, and viral hepatitis B and C.   -- Cirrhosis counseling as noted above and discussed with patient. Discussed with patient that do not recommend any elective abdominal surgery due to risk of hepatic decompensation.      2.  Fatty liver  -- risk factors for fatty liver: obesity, HTN, HLD, DM, PCOS  Recommend:  1. Weight loss goal of 40 lbs  2. Low carb/sugar, high fiber and protein diet  3. Exercise, 5 days per week, 30 minutes per day, as tolerated  4. Recommend good cholesterol, blood pressure, blood sugar levels   5. Consider enrolling in ARAYA fibrosis clinical trails since your fibroscan suggested that you may have fibrosis/scarring in your liver related to fatty liver  -  pt not interested at this time due to travel required frequently for trials, will consider in the future          Follow up in about 6 months (around 4/21/2023). with US and labs same day due to travel   Orders Placed This Encounter   Procedures    US Abdomen Limited    AFP Tumor Marker    CBC Without Differential    Comprehensive Metabolic Panel    Protime-INR        Thank you for allowing me to participate in the care of Jazzy Borges    KITA DowneyC    I spent a total of 30 minutes on the day of the visit.This includes face to face time and non-face to face time preparing to see the patient (eg, review of tests), obtaining and/or reviewing separately obtained history, documenting clinical information in the electronic or other health record, independently interpreting results and communicating results to the patient/family/caregiver, and coordinating care.       CC'ed note to:

## 2022-10-21 NOTE — PROGRESS NOTES
RESEARCH STUDY SPECIMEN COLLECTION ENCOUNTER  ORGAN TRANSPLANT  [facility] BAKARI KEVIN    Study Title: Role of Tumor-Induced Immune Tolerance in the Patient Response to Locoregional Therapy: Implications in Assessment Risk of Hepatocellular Carcinoma Recurrence Following Liver Transplantation    IRB #: 2016.131.B    IRB Approval Date: 6/8/2016    : Wilfredo Bhandari MD  Sub-investigator: Mirza Rosales, PhD    Patient Number: [id number]    In accordance with the study protocol, Research Lab orders were placed and specimens were collected on (date: 10/21/2022) in:  [facility] VNP: YES/NO: yes  [facility] LABTX: YES/NO: no  [facility] IM: YES/NO: no    Samara Joseph  Admin Research- Liver Transplant

## 2022-10-21 NOTE — PATIENT INSTRUCTIONS
FATTY LIVER  There is no FDA approved therapy for fatty liver disease. Therefore, these things are important:  1 Weight loss goal of 25-40 lbs, referral for [facility] if interested. Also, if interested in a dietician visit to create a weight loss plan, contact the dietician team at [facility] at [email or web address] to schedule a visit to you can call [facility] in [place]: [phone] and the  will transfer the call to one of the dieticians to schedule an appointment. Or you can also call [phone] to schedule. They do offer video visits   3. Low carb/sugar, high fiber and protein diet.Try to limit your carb intake to LESS than 30-45 grams of carbs with a meal or LESS than 5-10 grams with any snack (total of any snack foods eaten during that time). Use [employer] or Lose It gayle to add up your carbs through the day. Do NOT drink any beverages with calories or carbs (this can lead to high blood sugar and weight gain). Also, some of our patients have been very successful with weight loss using the pre made/planned meal planning services that limit calories and portion size ( The main thing to look for is low calorie, high protein, low carb)  4. Exercise, 5 days per week, 30 minutes per day, as tolerated  5. Recommend good cholesterol, blood pressure, blood sugar levels   6. Consider enrolling in ARAYA fibrosis clinical trails since your fibroscan suggested that you may have fibrosis/scarring in your liver related to fatty liver      In some people, fatty liver can progress to steatohepatitis (inflamatory fatty liver) and possibly to cirrhosis, increasing the risk for liver cancer, liver failure, and death. Therefore, the lifestyle changes are very important to decrease this risk.     Website with information about fatty liver and inflammation related to fatty liver (ARAYA) = www.nashtruth.com  AND www.NASHactually.com        CIRRHOSIS  This is a web site that you  may find helpful about cirrhosis : https://cirrhosiscare.ca/    Because you have cirrhosis, it is important to attend clinic visits every 6 months with an Ultrasound and blood tests every 6 months to screen for liver cancer (you are at risk of developing liver cancer due to scar tissue in the liver)    Signs and symptoms of worsening liver disease include jaundice, fluid in the belly (ascites), and confusion/disorientation/slowed thought processes due to hepatic encephalopathy (toxins building up because of liver problems).   You should seek medical attention if any of these things occur.    Also, possible bleeding from esophageal varices (blood vessels in the stomach and foodpipe can burst and cause fatal bleeding).  Therefore, if you have symptoms of vomiting blood, blood in your stool, dark or black stools or vomiting coffee ground vomit, YOU SHOULD GO TO THE EMERGENCY ROOM IMMEDIATELY.     Cirrhosis can increase the risk of liver cancer, liver failure, and death. However, we will watch your liver function score (MELD score) closely with each clinic visit. A normal MELD score is 6, highest is 40. Your last one was an 6. We will check this with every clinic visit. A MELD 15 or higher is when we start to consider transplant because MELD 15 or higher indicates that the liver is not functioning as well     Cirrhosis Counseling  - NO alcohol use (includes beer, wine, and/or liquor)  - avoid non-steroidal anti-inflammatory drugs (NSAIDs) such as ibuprofen, Motrin, naprosyn, Alleve due to the risk of kidney damage  - can take acetaminophen (Tylenol), no more than 2000 mg per day  - low sodium (salt) 2 gram per day diet  - high protein diet: 100 grams per day to prevent muscle mass loss. Drink at least 1 protein shake daily ([employer] is best option because it is very high protein and low sugar). Ok to use this as nighttime snack to fit it in   - resistance exercises for muscle strength  - avoid raw seafoods due to  the risk of fatal Vibrio vulnificus infection  - ultrasound of the liver every 6 months for liver cancer screening (you are at risk of developing liver cancer due to scar tissue in the liver)  - Upper endoscopy every 1-2 years to screen for varices in the stomach and foodpipe which can burst and cause fatal bleeding

## 2023-04-10 DIAGNOSIS — Z00.6 RESEARCH STUDY PATIENT: Primary | ICD-10-CM

## 2023-04-14 ENCOUNTER — RESEARCH ENCOUNTER (OUTPATIENT)
Dept: RESEARCH | Facility: HOSPITAL | Age: 48
End: 2023-04-14
Payer: COMMERCIAL

## 2023-04-14 ENCOUNTER — OFFICE VISIT (OUTPATIENT)
Dept: HEPATOLOGY | Facility: CLINIC | Age: 48
End: 2023-04-14
Payer: COMMERCIAL

## 2023-04-14 ENCOUNTER — TELEPHONE (OUTPATIENT)
Dept: HEPATOLOGY | Facility: CLINIC | Age: 48
End: 2023-04-14

## 2023-04-14 ENCOUNTER — HOSPITAL ENCOUNTER (OUTPATIENT)
Dept: RADIOLOGY | Facility: HOSPITAL | Age: 48
Discharge: HOME | End: 2023-04-14
Attending: NURSE PRACTITIONER
Payer: COMMERCIAL

## 2023-04-14 VITALS — WEIGHT: 219.81 LBS | HEIGHT: 66 IN | BODY MASS INDEX: 35.32 KG/M2

## 2023-04-14 DIAGNOSIS — K74.60 LIVER CIRRHOSIS SECONDARY TO NASH: Primary | ICD-10-CM

## 2023-04-14 DIAGNOSIS — K75.81 LIVER CIRRHOSIS SECONDARY TO NASH: Primary | ICD-10-CM

## 2023-04-14 DIAGNOSIS — K75.81 LIVER CIRRHOSIS SECONDARY TO NASH: ICD-10-CM

## 2023-04-14 DIAGNOSIS — E78.49 OTHER HYPERLIPIDEMIA: ICD-10-CM

## 2023-04-14 DIAGNOSIS — I10 ESSENTIAL HYPERTENSION: ICD-10-CM

## 2023-04-14 DIAGNOSIS — K74.60 LIVER CIRRHOSIS SECONDARY TO NASH: ICD-10-CM

## 2023-04-14 PROCEDURE — 1159F MED LIST DOCD IN RCRD: CPT | Mod: CPTII,S$GLB,, | Performed by: NURSE PRACTITIONER

## 2023-04-14 PROCEDURE — 99999 PR PBB SHADOW E&M-EST. PATIENT-LVL III: CPT | Mod: PBBFAC,,, | Performed by: NURSE PRACTITIONER

## 2023-04-14 PROCEDURE — 99999 PR PBB SHADOW E&M-EST. PATIENT-LVL III: ICD-10-PCS | Mod: PBBFAC,,, | Performed by: NURSE PRACTITIONER

## 2023-04-14 PROCEDURE — 1160F PR REVIEW ALL MEDS BY PRESCRIBER/CLIN PHARMACIST DOCUMENTED: ICD-10-PCS | Mod: CPTII,S$GLB,, | Performed by: NURSE PRACTITIONER

## 2023-04-14 PROCEDURE — 1159F PR MEDICATION LIST DOCUMENTED IN MEDICAL RECORD: ICD-10-PCS | Mod: CPTII,S$GLB,, | Performed by: NURSE PRACTITIONER

## 2023-04-14 PROCEDURE — 99214 PR OFFICE/OUTPT VISIT, EST, LEVL IV, 30-39 MIN: ICD-10-PCS | Mod: S$GLB,,, | Performed by: NURSE PRACTITIONER

## 2023-04-14 PROCEDURE — 99214 OFFICE O/P EST MOD 30 MIN: CPT | Mod: S$GLB,,, | Performed by: NURSE PRACTITIONER

## 2023-04-14 PROCEDURE — 3008F PR BODY MASS INDEX (BMI) DOCUMENTED: ICD-10-PCS | Mod: CPTII,S$GLB,, | Performed by: NURSE PRACTITIONER

## 2023-04-14 PROCEDURE — 76705 US ABDOMEN LIMITED: ICD-10-PCS | Mod: 26,,, | Performed by: STUDENT IN AN ORGANIZED HEALTH CARE EDUCATION/TRAINING PROGRAM

## 2023-04-14 PROCEDURE — 1160F RVW MEDS BY RX/DR IN RCRD: CPT | Mod: CPTII,S$GLB,, | Performed by: NURSE PRACTITIONER

## 2023-04-14 PROCEDURE — 3008F BODY MASS INDEX DOCD: CPT | Mod: CPTII,S$GLB,, | Performed by: NURSE PRACTITIONER

## 2023-04-14 PROCEDURE — 76705 ECHO EXAM OF ABDOMEN: CPT | Mod: 26,,, | Performed by: STUDENT IN AN ORGANIZED HEALTH CARE EDUCATION/TRAINING PROGRAM

## 2023-04-14 PROCEDURE — 76705 ECHO EXAM OF ABDOMEN: CPT | Mod: TC

## 2023-04-14 NOTE — PROGRESS NOTES
[facility] Established Patient Visit    Reason for Visit:  Cirrhosis     PCP: Gildardo Badillo MD (Inactive)    HPI:  This is a 47 y.o. 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 No s/s of hepatic decompensation: no ascites, HE or h/o variceal bleeding  Due for EGD, pt to see new GI MD locally soon for gastroparesis, pt reminded to contact them to schedule  On Ozempic but on lower dose, pt to ask new GI MD if ok to increase dose   Some weight loss since last visit, currently ~219, previously 225 lbs     Abd U/S done 4/2023 showed no lesions, + splenomegaly    Lab Results   Component Value Date    ALT 29 04/14/2023    AST 24 04/14/2023    ALKPHOS 96 04/14/2023    BILITOT 0.5 04/14/2023    ALBUMIN 4.2 04/14/2023    INR 1.0 04/14/2023     04/14/2023     MELD-Na score: 6 at 4/14/2023  9:05 AM  MELD score: 6 at 4/14/2023  9:05 AM  Calculated from:  Serum Creatinine: 0.8 mg/dL (Using min of 1 mg/dL) at 4/14/2023  9:05 AM  Serum Sodium: 140 mmol/L (Using max of 137 mmol/L) at 4/14/2023  9:05 AM  Total Bilirubin: 0.5 mg/dL (Using min of 1 mg/dL) at 4/14/2023  9:05 AM  INR(ratio): 1.0 at 4/14/2023  9:05 AM  Age: 47 years  MELD 6     Cirrhosis Health Maintenance:   -- Last EGD : due, pt will perform with local GI, pt reminded its overdue, pt aware  -- HCC screening              U/S  no lesions, next due 10/2023              AFP  WNL, next due 10/2023  Lab Results   Component Value Date    AFP 2.1 04/14/2023     -- Immunity to Hep A and B - needs TwinRix, reminded pt, will get it locally     "  Denies family history of liver disease . Denies current alcohol consumption  or history of significant alcohol consumption in past     PMHX:  has a past medical history of Diabetes, Essential hypertension (6/10/2019), Gastroparesis, Liver cirrhosis secondary to ARAYA (6/18/2019), Obesity (BMI 30-39.9) (9/8/2020), Other hyperlipidemia (6/10/2019), PCOS (polycystic ovarian syndrome), and Type 2 diabetes mellitus (6/10/2019).    PSHX:  has a past surgical history that includes Cholecystectomy (2000).    The patient's social and family histories were reviewed by me and updated in the appropriate section of the electronic medical record.    Review of patient's allergies indicates:  No Known Allergies    Current Outpatient Medications on File Prior to Visit   Medication Sig Dispense Refill    atorvastatin (LIPITOR) 20 MG tablet Take 20 mg by mouth once daily.  1    cholecalciferol, vitamin D3, 50,000 unit capsule Take 50,000 Units by mouth every 30 days.  1    ELURYNG 0.12-0.015 mg/24 hr vaginal ring       EScitalopram oxalate (LEXAPRO) 10 MG tablet Take 10 mg by mouth once daily.      lisinopril (PRINIVIL,ZESTRIL) 2.5 MG tablet Take 2.5 mg by mouth once daily.  3    OZEMPIC 0.25 mg or 0.5 mg(2 mg/1.5 mL) pen injector Inject 0.5 mg into the skin every 7 days.      SYNJARDY XR 25-1,000 mg TBph Take 2 tablets by mouth once daily.  1     No current facility-administered medications on file prior to visit.       SOCIAL HISTORY:   Social History     Substance and Sexual Activity   Alcohol Use Not Currently       Social History     Substance and Sexual Activity   Drug Use Never       ROS:   GENERAL: Denies fatigue  CARDIOVASCULAR: Denies edema  GI: Denies abdominal pain  SKIN: Denies rash, itching   NEURO: Denies confusion, memory loss, or mood changes    Objective Findings:    PHYSICAL EXAM:   Friendly White female, in no acute distress; alert and oriented to person, place and time  VITALS: Ht 5' 6" (1.676 m)   Wt 99.7 kg " (219 lb 12.8 oz)   BMI 35.48 kg/m²   EYES: Sclerae anicteric  GI: Soft, non-tender, non-distended. No ascites.  EXTREMITIES:  No edema.  SKIN: Warm and dry. No jaundice. No telangectasias noted. No palmar erythema.  NEURO:  No asterixis.  PSYCH:  Thought and speech pattern appropriate. Behavior normal      EDUCATION:  See instructions discussed with patient in Instructions section of the After Visit Summary       ASSESSMENT & PLAN:  47 y.o. White female with:  1.  Cirrhosis, due to ARAYA, well compensated  --- cirrhosis diagnosis based on ARAYA  -- MELD-Na score: 6 at 4/14/2023  9:05 AM  MELD score: 6 at 4/14/2023  9:05 AM  Calculated from:  Serum Creatinine: 0.8 mg/dL (Using min of 1 mg/dL) at 4/14/2023  9:05 AM  Serum Sodium: 140 mmol/L (Using max of 137 mmol/L) at 4/14/2023  9:05 AM  Total Bilirubin: 0.5 mg/dL (Using min of 1 mg/dL) at 4/14/2023  9:05 AM  INR(ratio): 1.0 at 4/14/2023  9:05 AM  Age: 47 years  -- HCC screening: AFP and abd. U/S.. U/S showed no lesions, AFP WNL - both next due 10/2023  -- EGD due, pt will obtain through outside/local GI - pt reminded   --- Previous serologic w/u was negative for Dylan's, alpha-1 antitrypsin deficiency, hemochromatosis, autoimmune etiology, and viral hepatitis B and C.   -- Cirrhosis counseling as noted above and discussed with patient. Discussed with patient that do not recommend any elective abdominal surgery due to risk of hepatic decompensation.      2.  Fatty liver  -- risk factors for fatty liver: obesity, HTN, HLD, DM, PCOS  Recommend:  1. Weight loss goal of 40 lbs  2. Low carb/sugar, high fiber and protein diet  3. Exercise, 5 days per week, 30 minutes per day, as tolerated  4. Recommend good cholesterol, blood pressure, blood sugar levels          Follow up in about 6 months (around 10/14/2023). with US and labs same day due to travel   Orders Placed This Encounter   Procedures    US Abdomen Limited    AFP Tumor Marker    CBC Without Differential     Comprehensive Metabolic Panel    Protime-INR        Thank you for allowing me to participate in the care of Jazzy Borges    KITA DownyeC    I spent a total of 30 minutes on the day of the visit.This includes face to face time and non-face to face time preparing to see the patient (eg, review of tests), obtaining and/or reviewing separately obtained history, documenting clinical information in the electronic or other health record, independently interpreting results and communicating results to the patient/family/caregiver, and coordinating care.       CC'ed note to:

## 2023-04-14 NOTE — TELEPHONE ENCOUNTER
----- Message from Vicky Garcia NP sent at 4/14/2023  1:59 PM CDT -----  Regarding: change her visit  Can you change her upcoming 6 month visit to in person instead of video ? Can keep the same date/time     Thanks !

## 2023-04-14 NOTE — PROGRESS NOTES
RESEARCH STUDY FOLLOW-UP SPECIMEN COLLECTION ENCOUNTER  ORGAN TRANSPLANT  [facility] BAKARI KEVIN    Study Title: Role of Tumor-Induced Immune Tolerance in the Patient Response to Locoregional Therapy: Implications in Assessment Risk of Hepatocellular Carcinoma Recurrence Following Liver Transplantation    IRB #: [id number].B    IRB Approval Date: 6/8/2016    : Wilfredo Bhandari MD  Sub-investigator: Mirza Rosales, PhD    Patient Number: [id number]    In accordance with the study protocol, [facility] orders were placed and follow-up specimens were collected on date 4/14/2023 in:  [facility] VNP: YES/NO: yes  [facility]: YES/NO: no  [facility] IM: YES/NO: no    Chalo Elizabeth  Admin Research- Liver Transplant

## 2023-04-14 NOTE — PATIENT INSTRUCTIONS
FATTY LIVER  There is no FDA approved therapy for fatty liver disease. Therefore, these things are important:  1 Weight loss goal of 25-40 lbs, referral for [facility] if interested. Also, if interested in a dietician visit to create a weight loss plan, contact the dietician team at [facility] at [email or web address] to schedule a visit to you can call [facility] in [place]: [phone] and the  will transfer the call to one of the dieticians to schedule an appointment. Or you can also call [phone] to schedule. They do offer video visits   3. Low carb/sugar, high fiber and protein diet.Try to limit your carb intake to LESS than 30-45 grams of carbs with a meal or LESS than 5-10 grams with any snack (total of any snack foods eaten during that time). Use Whistle Pal or Lose It gayle to add up your carbs through the day. Do NOT drink any beverages with calories or carbs (this can lead to high blood sugar and weight gain). Also, some of our patients have been very successful with weight loss using the pre made/planned meal planning services that limit calories and portion size ( The main thing to look for is low calorie, high protein, low carb)  4. Exercise, 5 days per week, 30 minutes per day, as tolerated  5. Recommend good cholesterol, blood pressure, blood sugar levels   6. Consider enrolling in ARAYA fibrosis clinical trails since your fibroscan suggested that you may have fibrosis/scarring in your liver related to fatty liver      In some people, fatty liver can progress to steatohepatitis (inflamatory fatty liver) and possibly to cirrhosis, increasing the risk for liver cancer, liver failure, and death. Therefore, the lifestyle changes are very important to decrease this risk.     Website with information about fatty liver and inflammation related to fatty liver (ARAYA) = www.nashtruth.com  AND www.NASHactually.com    HEP A/B VACCINE  Your immunity markers show  that you do NOT have immunity against Hepatitis A or B, so I recommend that you receive the combination Hepatitis A and B vaccine called TwinRix. This will protect your liver from these viruses, which can make your liver very sick.     Hep A can be transmitted through food and water and can cause significant liver injury. There was previously a significant Hepatitis A outbreak in [place]. Hep B vaccine is transmitted through blood or bodily fluids (there are no symptoms typically) and can develop longstanding (chronic) Hep B and there is no cure, so many people have it for life. Therefore, the vaccines provide immunity against these viruses that can cause harm to the liver.     The vaccine series is 3 vaccines: one now, one at 4 weeks and one 6 months after the 1st one        CIRRHOSIS  This is a web site that you may find helpful about cirrhosis : https://cirrhosiscare.ca/    Because you have cirrhosis, it is important to attend clinic visits every 6 months with an Ultrasound and blood tests every 6 months to screen for liver cancer (you are at risk of developing liver cancer due to scar tissue in the liver)    Signs and symptoms of worsening liver disease include jaundice, fluid in the belly (ascites), and confusion/disorientation/slowed thought processes due to hepatic encephalopathy (toxins building up because of liver problems).   You should seek medical attention if any of these things occur.    Also, possible bleeding from esophageal varices (blood vessels in the stomach and foodpipe can burst and cause fatal bleeding).  Therefore, if you have symptoms of vomiting blood, blood in your stool, dark or black stools or vomiting coffee ground vomit, YOU SHOULD GO TO THE EMERGENCY ROOM IMMEDIATELY.     Cirrhosis can increase the risk of liver cancer, liver failure, and death. However, we will watch your liver function score (MELD score) closely with each clinic visit. A normal MELD score is 6, highest is 40. Your  last one was an 6. We will check this with every clinic visit. A MELD 15 or higher is when we start to consider transplant because MELD 15 or higher indicates that the liver is not functioning as well     Cirrhosis Counseling  - NO alcohol use (includes beer, wine, and/or liquor)  - avoid non-steroidal anti-inflammatory drugs (NSAIDs) such as ibuprofen, Motrin, naprosyn, Alleve due to the risk of kidney damage  - can take acetaminophen (Tylenol), no more than 2000 mg per day  - low sodium (salt) 2 gram per day diet  - high protein diet: 100 grams per day to prevent muscle mass loss. Drink at least 1 protein shake daily (Premier Protein is best option because it is very high protein and low sugar). Ok to use this as nighttime snack to fit it in   - resistance exercises for muscle strength  - avoid raw seafoods due to the risk of fatal Vibrio vulnificus infection  - ultrasound of the liver every 6 months for liver cancer screening (you are at risk of developing liver cancer due to scar tissue in the liver)  - Upper endoscopy every 1-2 years to screen for varices in the stomach and foodpipe which can burst and cause fatal bleeding

## 2023-09-22 DIAGNOSIS — Z00.6 RESEARCH STUDY PATIENT: Primary | ICD-10-CM

## 2023-10-13 ENCOUNTER — OFFICE VISIT (OUTPATIENT)
Dept: HEPATOLOGY | Facility: CLINIC | Age: 48
End: 2023-10-13
Payer: COMMERCIAL

## 2023-10-13 ENCOUNTER — RESEARCH ENCOUNTER (OUTPATIENT)
Dept: RESEARCH | Facility: HOSPITAL | Age: 48
End: 2023-10-13
Payer: COMMERCIAL

## 2023-10-13 ENCOUNTER — HOSPITAL ENCOUNTER (OUTPATIENT)
Dept: RADIOLOGY | Facility: HOSPITAL | Age: 48
Discharge: HOME | End: 2023-10-13
Attending: NURSE PRACTITIONER
Payer: COMMERCIAL

## 2023-10-13 VITALS — WEIGHT: 228.38 LBS | HEIGHT: 66 IN | BODY MASS INDEX: 36.7 KG/M2

## 2023-10-13 DIAGNOSIS — E78.49 OTHER HYPERLIPIDEMIA: ICD-10-CM

## 2023-10-13 DIAGNOSIS — K74.60 LIVER CIRRHOSIS SECONDARY TO NASH: Primary | ICD-10-CM

## 2023-10-13 DIAGNOSIS — K75.81 LIVER CIRRHOSIS SECONDARY TO NASH: ICD-10-CM

## 2023-10-13 DIAGNOSIS — K74.60 LIVER CIRRHOSIS SECONDARY TO NASH: ICD-10-CM

## 2023-10-13 DIAGNOSIS — I10 ESSENTIAL HYPERTENSION: ICD-10-CM

## 2023-10-13 DIAGNOSIS — E66.9 OBESITY (BMI 30-39.9): ICD-10-CM

## 2023-10-13 DIAGNOSIS — K75.81 LIVER CIRRHOSIS SECONDARY TO NASH: Primary | ICD-10-CM

## 2023-10-13 DIAGNOSIS — E11.9 TYPE 2 DIABETES MELLITUS WITHOUT COMPLICATION, WITHOUT LONG-TERM CURRENT USE OF INSULIN: ICD-10-CM

## 2023-10-13 PROCEDURE — 1159F MED LIST DOCD IN RCRD: CPT | Mod: CPTII,S$GLB,, | Performed by: NURSE PRACTITIONER

## 2023-10-13 PROCEDURE — 3008F BODY MASS INDEX DOCD: CPT | Mod: CPTII,S$GLB,, | Performed by: NURSE PRACTITIONER

## 2023-10-13 PROCEDURE — 76705 ECHO EXAM OF ABDOMEN: CPT | Mod: 26,,, | Performed by: RADIOLOGY

## 2023-10-13 PROCEDURE — 76705 US ABDOMEN LIMITED: ICD-10-PCS | Mod: 26,,, | Performed by: RADIOLOGY

## 2023-10-13 PROCEDURE — 1160F RVW MEDS BY RX/DR IN RCRD: CPT | Mod: CPTII,S$GLB,, | Performed by: NURSE PRACTITIONER

## 2023-10-13 PROCEDURE — 1159F PR MEDICATION LIST DOCUMENTED IN MEDICAL RECORD: ICD-10-PCS | Mod: CPTII,S$GLB,, | Performed by: NURSE PRACTITIONER

## 2023-10-13 PROCEDURE — 99999 PR PBB SHADOW E&M-EST. PATIENT-LVL III: ICD-10-PCS | Mod: PBBFAC,,, | Performed by: NURSE PRACTITIONER

## 2023-10-13 PROCEDURE — 1160F PR REVIEW ALL MEDS BY PRESCRIBER/CLIN PHARMACIST DOCUMENTED: ICD-10-PCS | Mod: CPTII,S$GLB,, | Performed by: NURSE PRACTITIONER

## 2023-10-13 PROCEDURE — 4010F PR ACE/ARB THEARPY RXD/TAKEN: ICD-10-PCS | Mod: CPTII,S$GLB,, | Performed by: NURSE PRACTITIONER

## 2023-10-13 PROCEDURE — 99214 OFFICE O/P EST MOD 30 MIN: CPT | Mod: S$GLB,,, | Performed by: NURSE PRACTITIONER

## 2023-10-13 PROCEDURE — 4010F ACE/ARB THERAPY RXD/TAKEN: CPT | Mod: CPTII,S$GLB,, | Performed by: NURSE PRACTITIONER

## 2023-10-13 PROCEDURE — 3008F PR BODY MASS INDEX (BMI) DOCUMENTED: ICD-10-PCS | Mod: CPTII,S$GLB,, | Performed by: NURSE PRACTITIONER

## 2023-10-13 PROCEDURE — 99214 PR OFFICE/OUTPT VISIT, EST, LEVL IV, 30-39 MIN: ICD-10-PCS | Mod: S$GLB,,, | Performed by: NURSE PRACTITIONER

## 2023-10-13 PROCEDURE — 99999 PR PBB SHADOW E&M-EST. PATIENT-LVL III: CPT | Mod: PBBFAC,,, | Performed by: NURSE PRACTITIONER

## 2023-10-13 PROCEDURE — 76705 ECHO EXAM OF ABDOMEN: CPT | Mod: TC

## 2023-10-13 RX ORDER — SEMAGLUTIDE 1.34 MG/ML
1 INJECTION, SOLUTION SUBCUTANEOUS
COMMUNITY
Start: 2023-09-26

## 2023-10-13 RX ORDER — SODIUM, POTASSIUM,MAG SULFATES 17.5-3.13G
SOLUTION, RECONSTITUTED, ORAL ORAL
COMMUNITY
Start: 2023-08-29

## 2023-10-13 NOTE — PROGRESS NOTES
[facility] Established Patient Visit    Reason for Visit:  Cirrhosis     PCP: Gildardo Badillo (Inactive)    HPI:  This is a 47 y.o. 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 No s/s of hepatic decompensation: no ascites, HE or h/o variceal bleeding  Completed EGD recently with outside GI Dr. Campos [facility]  Does have gastroparesis, recently increased Ozempic to 1 mg weekly   Some weight gain since last visit, currently ~228 lbs      Abd U/S done 10/2023 showed no lesions, + splenomegaly    Lab Results   Component Value Date    ALT 21 10/13/2023    AST 16 10/13/2023    ALKPHOS 84 10/13/2023    BILITOT 0.4 10/13/2023    ALBUMIN 3.9 10/13/2023    INR 1.0 10/13/2023     10/13/2023     MELD 3.0: 7 at 10/13/2023  8:00 AM  MELD-Na: 6 at 10/13/2023  8:00 AM  Calculated from:  Serum Creatinine: 0.8 mg/dL (Using min of 1 mg/dL) at 10/13/2023  8:00 AM  Serum Sodium: 138 mmol/L (Using max of 137 mmol/L) at 10/13/2023  8:00 AM  Total Bilirubin: 0.4 mg/dL (Using min of 1 mg/dL) at 10/13/2023  8:00 AM  Serum Albumin: 3.9 g/dL (Using max of 3.5 g/dL) at 10/13/2023  8:00 AM  INR(ratio): 1.0 at 10/13/2023  8:00 AM  Age at listing (hypothetical): 47 years  Sex: Female at 10/13/2023  8:00 AM    MELD 7     Cirrhosis Health Maintenance:   -- Last EGD : reports recent EGD 10/2023 without varices, will request report, next due 10/2025  -- HCC screening              U/S  no lesions, next due 4/2024              AFP  WNL, next due 4/2024  Lab Results   Component Value Date    AFP 2.1  04/14/2023     -- Immunity to Hep A and B - needs TwinRix, reminded pt, will get it locally      Denies family history of liver disease . Denies current alcohol consumption  or history of significant alcohol consumption in past     PMHX:  has a past medical history of Diabetes, Essential hypertension (6/10/2019), Gastroparesis, Liver cirrhosis secondary to ARAYA (6/18/2019), Obesity (BMI 30-39.9) (9/8/2020), Other hyperlipidemia (6/10/2019), PCOS (polycystic ovarian syndrome), and Type 2 diabetes mellitus (6/10/2019).    PSHX:  has a past surgical history that includes Cholecystectomy (2000).    The patient's social and family histories were reviewed by me and updated in the appropriate section of the electronic medical record.    Review of patient's allergies indicates:  No Known Allergies    Current Outpatient Medications on File Prior to Visit   Medication Sig Dispense Refill    atorvastatin (LIPITOR) 20 MG tablet Take 20 mg by mouth once daily.  1    ELURYNG 0.12-0.015 mg/24 hr vaginal ring       lisinopril (PRINIVIL,ZESTRIL) 2.5 MG tablet Take 2.5 mg by mouth once daily.  3    OZEMPIC 0.25 mg or 0.5 mg(2 mg/1.5 mL) pen injector Inject 0.5 mg into the skin every 7 days.      OZEMPIC 1 mg/dose (4 mg/3 mL) Inject 1 mg into the skin every 7 days.      sodium,potassium,mag sulfates (SUPREP BOWEL PREP KIT) 17.5-3.13-1.6 gram SolR TAKE AS DIRECTED ON THE BOX OR AS DIRECTED BY YOUR PHYSICIAN FOR ONE DAY      SYNJARDY XR 25-1,000 mg TBph Take 2 tablets by mouth once daily.  1    EScitalopram oxalate (LEXAPRO) 10 MG tablet Take 10 mg by mouth once daily.       No current facility-administered medications on file prior to visit.       SOCIAL HISTORY:   Social History     Substance and Sexual Activity   Alcohol Use Not Currently       Social History     Substance and Sexual Activity   Drug Use Never       ROS:   GENERAL: Denies fatigue  CARDIOVASCULAR: Denies edema  GI: Denies abdominal pain  SKIN: Denies rash, itching  "  NEURO: Denies confusion, memory loss, or mood changes    Objective Findings:    PHYSICAL EXAM:   Friendly White female, in no acute distress; alert and oriented to person, place and time  VITALS: Ht 5' 6" (1.676 m)   Wt 103.6 kg (228 lb 6.3 oz)   BMI 36.86 kg/m²   EYES: Sclerae anicteric  GI: Soft, non-tender, non-distended. No ascites.  EXTREMITIES:  No edema.  SKIN: Warm and dry. No jaundice. No telangectasias noted. No palmar erythema.  NEURO:  No asterixis.  PSYCH:  Thought and speech pattern appropriate. Behavior normal      EDUCATION:  See instructions discussed with patient in Instructions section of the After Visit Summary       ASSESSMENT & PLAN:  47 y.o. White female with:  1.  Cirrhosis, due to ARAYA, well compensated  --- cirrhosis diagnosis based on ARAYA  -- MELD 3.0: 7 at 10/13/2023  8:00 AM  MELD-Na: 6 at 10/13/2023  8:00 AM  Calculated from:  Serum Creatinine: 0.8 mg/dL (Using min of 1 mg/dL) at 10/13/2023  8:00 AM  Serum Sodium: 138 mmol/L (Using max of 137 mmol/L) at 10/13/2023  8:00 AM  Total Bilirubin: 0.4 mg/dL (Using min of 1 mg/dL) at 10/13/2023  8:00 AM  Serum Albumin: 3.9 g/dL (Using max of 3.5 g/dL) at 10/13/2023  8:00 AM  INR(ratio): 1.0 at 10/13/2023  8:00 AM  Age at listing (hypothetical): 47 years  Sex: Female at 10/13/2023  8:00 AM    -- HCC screening: AFP and abd. U/S.. U/S showed no lesions, AFP WNL - both next due 4/2024  -- EGD due 10/2025 with Dr. Campos, will request records from EGD 10/2023  --- Previous serologic w/u in HPI  -- Cirrhosis counseling as noted above and discussed with patient     2.  Fatty liver  -- risk factors for fatty liver: obesity, HTN, HLD, DM, PCOS  Recommend:  1. Weight loss goal of 40 lbs  2. Low carb/sugar, high fiber and protein diet  3. Exercise, 5 days per week, 30 minutes per day, as tolerated  4. Recommend good cholesterol, blood pressure, blood sugar levels          Follow up in about 6 months (around 4/13/2024). with US and labs same day due " to travel   Orders Placed This Encounter   Procedures    US Abdomen Limited    AFP Tumor Marker    CBC Without Differential    Comprehensive Metabolic Panel    Protime-INR        Thank you for allowing me to participate in the care of ISI Daniels    I spent a total of 30 minutes on the day of the visit.This includes face to face time and non-face to face time preparing to see the patient (eg, review of tests), obtaining and/or reviewing separately obtained history, documenting clinical information in the electronic or other health record, independently interpreting results and communicating results to the patient/family/caregiver, and coordinating care.       CC'ed note to:

## 2023-10-13 NOTE — PROGRESS NOTES
RESEARCH STUDY SPECIMEN COLLECTION ENCOUNTER  ORGAN TRANSPLANT  [facility] BAKARI KEVIN    Study Title: Role of Tumor-Induced Immune Tolerance in the Patient Response to Locoregional Therapy: Implications in Assessment Risk of Hepatocellular Carcinoma Recurrence Following Liver Transplantation    IRB #: [id number].B    IRB Approval Date: 6/8/2016    : Wilfredo Bhandari MD  Sub-investigator: Mirza Rosales, PhD    Patient Number: [id number]    In accordance with the study protocol, [facility] orders were placed and follow-up specimens were collected on (date: 10/13/2023) in:  [facility] VNP: YES/NO: Yes  [facility]TX: YES/NO: No  [facility] IM: YES/NO: No  Other [facility] location: YES/NO: No   Location: N/A    A  was used to transport blood specimens to [facility] for processing: YES/NO: No  Blood specimens were transported to [facility] for processing: YES/NO: Yes    Chalo Elizabeth  Admin [facility]- Liver Transplant

## 2023-10-13 NOTE — PATIENT INSTRUCTIONS
FATTY LIVER  There is no FDA approved therapy for fatty liver disease. Therefore, these things are important:  1 Weight loss goal of 25-40 lbs, referral for [facility] if interested. Also, if interested in a dietician visit to create a weight loss plan, contact the dietician team at [facility] at [email or web address] to schedule a visit to you can call [facility] in [place]: [phone] and the  will transfer the call to one of the dieticians to schedule an appointment. Or you can also call [phone] to schedule. They do offer video visits   3. Low carb/sugar, high fiber and protein diet.Try to limit your carb intake to LESS than 30-45 grams of carbs with a meal or LESS than 5-10 grams with any snack (total of any snack foods eaten during that time). Use [employer] Pal or Lose It gayle to add up your carbs through the day. Do NOT drink any beverages with calories or carbs (this can lead to high blood sugar and weight gain). Also, some of our patients have been very successful with weight loss using the pre made/planned meal planning services that limit calories and portion size ( The main thing to look for is low calorie, high protein, low carb)  4. Exercise, 5 days per week, 30 minutes per day, as tolerated  5. Recommend good cholesterol, blood pressure, blood sugar levels   6. Consider enrolling in ARAYA fibrosis clinical trails since your fibroscan suggested that you may have fibrosis/scarring in your liver related to fatty liver      In some people, fatty liver can progress to steatohepatitis (inflamatory fatty liver) and possibly to cirrhosis, increasing the risk for liver cancer, liver failure, and death. Therefore, the lifestyle changes are very important to decrease this risk.     Website with information about fatty liver and inflammation related to fatty liver (ARAYA) = www.nashtruth.com  AND www.NASHactually.com      CIRRHOSIS  This is a web site that you may  find helpful about cirrhosis : https://cirrhosiscare.ca/    Because you have cirrhosis, it is important to attend clinic visits every 6 months with an Ultrasound and blood tests every 6 months to screen for liver cancer (you are at risk of developing liver cancer due to scar tissue in the liver)    Signs and symptoms of worsening liver disease include jaundice, fluid in the belly (ascites), and confusion/disorientation/slowed thought processes due to hepatic encephalopathy (toxins building up because of liver problems).   You should seek medical attention if any of these things occur.    Also, possible bleeding from esophageal varices (blood vessels in the stomach and foodpipe can burst and cause fatal bleeding).  Therefore, if you have symptoms of vomiting blood, blood in your stool, dark or black stools or vomiting coffee ground vomit, YOU SHOULD GO TO THE EMERGENCY ROOM IMMEDIATELY.     Cirrhosis can increase the risk of liver cancer, liver failure, and death. However, we will watch your liver function score (MELD score) closely with each clinic visit. A normal MELD score is 6, highest is 40. Your last one was an 6. We will check this with every clinic visit. A MELD 15 or higher is when we start to consider transplant because MELD 15 or higher indicates that the liver is not functioning as well     Cirrhosis Counseling  - NO alcohol use (includes beer, wine, and/or liquor)  - avoid non-steroidal anti-inflammatory drugs (NSAIDs) such as ibuprofen, Motrin, naprosyn, Alleve due to the risk of kidney damage  - can take acetaminophen (Tylenol), no more than 2000 mg per day  - low sodium (salt) 2 gram per day diet  - high protein diet: 100 grams per day to prevent muscle mass loss. Drink at least 1 protein shake daily (Premier Protein is best option because it is very high protein and low sugar). Ok to use this as nighttime snack to fit it in   - resistance exercises for muscle strength  - avoid raw seafoods due to the  risk of fatal Vibrio vulnificus infection  - ultrasound of the liver every 6 months for liver cancer screening (you are at risk of developing liver cancer due to scar tissue in the liver)  - Upper endoscopy every 1-2 years to screen for varices in the stomach and foodpipe which can burst and cause fatal bleeding

## 2023-10-25 ENCOUNTER — TELEPHONE (OUTPATIENT)
Dept: HEPATOLOGY | Facility: CLINIC | Age: 48
End: 2023-10-25
Payer: COMMERCIAL

## 2024-03-19 ENCOUNTER — PATIENT MESSAGE (OUTPATIENT)
Dept: HEPATOLOGY | Facility: CLINIC | Age: 49
End: 2024-03-19
Payer: COMMERCIAL

## 2024-04-12 ENCOUNTER — HOSPITAL ENCOUNTER (OUTPATIENT)
Dept: RADIOLOGY | Facility: HOSPITAL | Age: 49
Discharge: HOME | End: 2024-04-12
Attending: NURSE PRACTITIONER
Payer: COMMERCIAL

## 2024-04-12 ENCOUNTER — OFFICE VISIT (OUTPATIENT)
Dept: HEPATOLOGY | Facility: CLINIC | Age: 49
End: 2024-04-12
Payer: COMMERCIAL

## 2024-04-12 VITALS — WEIGHT: 228.63 LBS | HEIGHT: 66 IN | BODY MASS INDEX: 36.74 KG/M2

## 2024-04-12 DIAGNOSIS — E66.01 CLASS 2 SEVERE OBESITY DUE TO EXCESS CALORIES WITH SERIOUS COMORBIDITY AND BODY MASS INDEX (BMI) OF 36.0 TO 36.9 IN ADULT: ICD-10-CM

## 2024-04-12 DIAGNOSIS — E78.49 OTHER HYPERLIPIDEMIA: ICD-10-CM

## 2024-04-12 DIAGNOSIS — K75.81 LIVER CIRRHOSIS SECONDARY TO NASH: Primary | ICD-10-CM

## 2024-04-12 DIAGNOSIS — K75.81 LIVER CIRRHOSIS SECONDARY TO NASH: ICD-10-CM

## 2024-04-12 DIAGNOSIS — K74.60 LIVER CIRRHOSIS SECONDARY TO NASH: Primary | ICD-10-CM

## 2024-04-12 DIAGNOSIS — E11.65 TYPE 2 DIABETES MELLITUS WITH HYPERGLYCEMIA, WITHOUT LONG-TERM CURRENT USE OF INSULIN: ICD-10-CM

## 2024-04-12 DIAGNOSIS — K74.60 LIVER CIRRHOSIS SECONDARY TO NASH: ICD-10-CM

## 2024-04-12 PROBLEM — E66.9 OBESITY (BMI 30-39.9): Status: RESOLVED | Noted: 2020-09-08 | Resolved: 2024-04-12

## 2024-04-12 PROBLEM — E66.812 CLASS 2 SEVERE OBESITY DUE TO EXCESS CALORIES WITH SERIOUS COMORBIDITY AND BODY MASS INDEX (BMI) OF 36.0 TO 36.9 IN ADULT: Status: ACTIVE | Noted: 2024-04-12

## 2024-04-12 PROCEDURE — 76705 ECHO EXAM OF ABDOMEN: CPT | Mod: TC

## 2024-04-12 PROCEDURE — 76705 ECHO EXAM OF ABDOMEN: CPT | Mod: 26,,, | Performed by: STUDENT IN AN ORGANIZED HEALTH CARE EDUCATION/TRAINING PROGRAM

## 2024-04-12 PROCEDURE — 99999 PR PBB SHADOW E&M-EST. PATIENT-LVL III: CPT | Mod: PBBFAC,,, | Performed by: NURSE PRACTITIONER

## 2024-04-12 PROCEDURE — 99214 OFFICE O/P EST MOD 30 MIN: CPT | Mod: S$GLB,,, | Performed by: NURSE PRACTITIONER

## 2024-04-12 PROCEDURE — 4010F ACE/ARB THERAPY RXD/TAKEN: CPT | Mod: CPTII,S$GLB,, | Performed by: NURSE PRACTITIONER

## 2024-04-12 PROCEDURE — 1159F MED LIST DOCD IN RCRD: CPT | Mod: CPTII,S$GLB,, | Performed by: NURSE PRACTITIONER

## 2024-04-12 PROCEDURE — 1160F RVW MEDS BY RX/DR IN RCRD: CPT | Mod: CPTII,S$GLB,, | Performed by: NURSE PRACTITIONER

## 2024-04-12 PROCEDURE — 3008F BODY MASS INDEX DOCD: CPT | Mod: CPTII,S$GLB,, | Performed by: NURSE PRACTITIONER

## 2024-04-12 RX ORDER — TIRZEPATIDE 2.5 MG/.5ML
INJECTION, SOLUTION SUBCUTANEOUS
COMMUNITY
Start: 2024-03-31

## 2024-04-12 NOTE — PROGRESS NOTES
[facility] Established Patient Visit    Reason for Visit:  Cirrhosis     PCP: Gildardo Badillo (Inactive)    HPI:  This is a 48 y.o. 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 No s/s of hepatic decompensation: no ascites, HE or h/o variceal bleeding  Started Monjouro 2.5 mg weekly, doing well so far   Had issues with ozempic and GI issues  Current weight  ~228 lbs      Abd U/S done 4/2024 showed no lesions, + near splenomegaly    Lab Results   Component Value Date    ALT 38 04/12/2024    AST 35 04/12/2024    ALKPHOS 91 04/12/2024    BILITOT 0.4 04/12/2024    ALBUMIN 4.1 04/12/2024    INR 0.9 04/12/2024     04/12/2024     MELD 3.0: 7 at 4/12/2024  7:48 AM  MELD-Na: 6 at 4/12/2024  7:48 AM  Calculated from:  Serum Creatinine: 0.9 mg/dL (Using min of 1 mg/dL) at 4/12/2024  7:48 AM  Serum Sodium: 140 mmol/L (Using max of 137 mmol/L) at 4/12/2024  7:48 AM  Total Bilirubin: 0.4 mg/dL (Using min of 1 mg/dL) at 4/12/2024  7:48 AM  Serum Albumin: 4.1 g/dL (Using max of 3.5 g/dL) at 4/12/2024  7:48 AM  INR(ratio): 0.9 (Using min of 1) at 4/12/2024  7:48 AM  Age at listing (hypothetical): 48 years  Sex: Female at 4/12/2024  7:48 AM    MELD 7     Cirrhosis Health Maintenance:   -- Last EGD :  outside GI Dr. Campos [facility] 10/2023 without varices, next due 10/2025  Colon 2028  -- HCC screening              U/S  no lesions, next due 10/2024              AFP  WNL, next due 10/2024  Lab Results   Component Value Date    AFP <2.0 04/12/2024     -- Immunity to Hep A and B - needs  TwinRix, getting 3rd dose locally in June      Denies family history of liver disease . Denies current alcohol consumption  or history of significant alcohol consumption in past     PMHX:  has a past medical history of Diabetes, Essential hypertension (6/10/2019), Gastroparesis, Liver cirrhosis secondary to ARAYA (6/18/2019), Obesity (BMI 30-39.9) (9/8/2020), Other hyperlipidemia (6/10/2019), PCOS (polycystic ovarian syndrome), and Type 2 diabetes mellitus (6/10/2019).    PSHX:  has a past surgical history that includes Cholecystectomy (2000).    The patient's social and family histories were reviewed by me and updated in the appropriate section of the electronic medical record.    Review of patient's allergies indicates:  No Known Allergies    Current Outpatient Medications on File Prior to Visit   Medication Sig Dispense Refill    atorvastatin (LIPITOR) 20 MG tablet Take 20 mg by mouth once daily.  1    lisinopril (PRINIVIL,ZESTRIL) 2.5 MG tablet Take 2.5 mg by mouth once daily.  3    MOUNJARO 2.5 mg/0.5 mL PnIj       sodium,potassium,mag sulfates (SUPREP BOWEL PREP KIT) 17.5-3.13-1.6 gram SolR TAKE AS DIRECTED ON THE BOX OR AS DIRECTED BY YOUR PHYSICIAN FOR ONE DAY      SYNJARDY XR 25-1,000 mg TBph Take 2 tablets by mouth once daily.  1    [DISCONTINUED] ELURYNG 0.12-0.015 mg/24 hr vaginal ring       [DISCONTINUED] OZEMPIC 1 mg/dose (4 mg/3 mL) Inject 1 mg into the skin every 7 days.       No current facility-administered medications on file prior to visit.       SOCIAL HISTORY:   Social History     Substance and Sexual Activity   Alcohol Use Not Currently       Social History     Substance and Sexual Activity   Drug Use Never       ROS:   GENERAL: Denies fatigue  CARDIOVASCULAR: Denies edema  GI: Denies abdominal pain  SKIN: Denies rash, itching   NEURO: Denies confusion, memory loss, or mood changes    Objective Findings:    PHYSICAL EXAM:   Friendly White female, in no acute distress; alert and oriented to  "person, place and time  VITALS: Ht 5' 6" (1.676 m)   Wt 103.7 kg (228 lb 9.9 oz)   BMI 36.90 kg/m²   EYES: Sclerae anicteric  GI: Soft, non-tender, non-distended. No ascites.  EXTREMITIES:  No edema.  SKIN: Warm and dry. No jaundice. No telangectasias noted. No palmar erythema.  NEURO:  No asterixis.  PSYCH:  Thought and speech pattern appropriate. Behavior normal      EDUCATION:  See instructions discussed with patient in Instructions section of the After Visit Summary       ASSESSMENT & PLAN:  48 y.o. White female with:  1.  Cirrhosis, due to ARAYA, well compensated  Not a candidate for Rezdiffra given cirrhosis on biopsy, will continue to monitor if anything changes with med indications   --- cirrhosis diagnosis based on ARAYA  -- MELD 3.0: 7 at 4/12/2024  7:48 AM  MELD-Na: 6 at 4/12/2024  7:48 AM  Calculated from:  Serum Creatinine: 0.9 mg/dL (Using min of 1 mg/dL) at 4/12/2024  7:48 AM  Serum Sodium: 140 mmol/L (Using max of 137 mmol/L) at 4/12/2024  7:48 AM  Total Bilirubin: 0.4 mg/dL (Using min of 1 mg/dL) at 4/12/2024  7:48 AM  Serum Albumin: 4.1 g/dL (Using max of 3.5 g/dL) at 4/12/2024  7:48 AM  INR(ratio): 0.9 (Using min of 1) at 4/12/2024  7:48 AM  Age at listing (hypothetical): 48 years  Sex: Female at 4/12/2024  7:48 AM    -- HCC screening: AFP and abd. U/S.. U/S showed no lesions, AFP WNL - both next due 10/2024  -- EGD due 10/2025 with Dr. Campos in [place]  --- Previous serologic w/u in [facility]  -- Cirrhosis counseling as noted above and discussed with patient     2.  Fatty liver  -- risk factors for fatty liver: obesity, HTN, HLD, DM, PCOS  Recommend:  1. Weight loss goal of 40 lbs  2. Low carb/sugar, high fiber and protein diet  3. Exercise, 5 days per week, 30 minutes per day, as tolerated  4. Recommend good cholesterol, blood pressure, blood sugar levels          Follow up in about 6 months (around 10/12/2024). with US and labs same day due to travel   Orders Placed This Encounter   Procedures    US " Abdomen Limited    AFP Tumor Marker    CBC Without Differential    Comprehensive Metabolic Panel    Protime-INR        Thank you for allowing me to participate in the care of Jazzy Borges    ISI Downey    I spent a total of 30 minutes on the day of the visit.This includes face to face time and non-face to face time preparing to see the patient (eg, review of tests), obtaining and/or reviewing separately obtained history, documenting clinical information in the electronic or other health record, independently interpreting results and communicating results to the patient/family/caregiver, and coordinating care.       CC'ed note to:

## 2024-04-12 NOTE — PATIENT INSTRUCTIONS
There is a medication for fatty liver and scar tissue called Rezdiffra but currently it cannot be used in cirrhosis as this time     This is a web site that you may find helpful about cirrhosis : https://cirrhosiscare.ca/    Because you have cirrhosis, it is important to attend clinic visits every 6 months with an Ultrasound and blood tests every 6 months to screen for liver cancer (you are at risk of developing liver cancer due to scar tissue in the liver)    Signs and symptoms of worsening liver disease include jaundice, fluid in the belly (ascites), and confusion/disorientation/slowed thought processes due to hepatic encephalopathy (toxins building up because of liver problems).   You should seek medical attention if any of these things occur.    Also, possible bleeding from esophageal varices (blood vessels in the stomach and foodpipe can burst and cause fatal bleeding).  Therefore, if you have symptoms of vomiting blood, blood in your stool, dark or black stools or vomiting coffee ground vomit, YOU SHOULD GO TO THE EMERGENCY ROOM IMMEDIATELY.     Cirrhosis can increase the risk of liver cancer, liver failure, and death. However, we will watch your liver function score (MELD score) closely with each clinic visit. A normal MELD score is 6, highest is 40. Your last one was an 7. We will check this with every clinic visit. A MELD 15 or higher is when we start to consider transplant because MELD 15 or higher indicates that the liver is not functioning as well     Cirrhosis Counseling  - NO alcohol use (includes beer, wine, and/or liquor)  - avoid non-steroidal anti-inflammatory drugs (NSAIDs) such as ibuprofen, Motrin, naprosyn, Alleve due to the risk of kidney damage  - can take acetaminophen (Tylenol), no more than 2000 mg per day  - low sodium (salt) 2 gram per day diet  - high protein diet: 110 grams per day to prevent muscle mass loss. Drink at least 1 protein shake daily (Premier Protein is best option because  it is very high protein and low sugar). Ok to use this as nighttime snack to fit it in   - resistance exercises for muscle strength  - avoid raw seafoods due to the risk of fatal Vibrio vulnificus infection  - ultrasound of the liver every 6 months for liver cancer screening (you are at risk of developing liver cancer due to scar tissue in the liver)  - Upper endoscopy every 1-2 years to screen for varices in the stomach and foodpipe which can burst and cause fatal bleeding

## 2024-10-18 DIAGNOSIS — Z00.6 RESEARCH STUDY PATIENT: Primary | ICD-10-CM

## 2024-10-21 ENCOUNTER — OFFICE VISIT (OUTPATIENT)
Dept: HEPATOLOGY | Facility: CLINIC | Age: 49
End: 2024-10-21
Payer: COMMERCIAL

## 2024-10-21 ENCOUNTER — HOSPITAL ENCOUNTER (OUTPATIENT)
Dept: RADIOLOGY | Facility: HOSPITAL | Age: 49
Discharge: HOME | End: 2024-10-21
Attending: NURSE PRACTITIONER
Payer: COMMERCIAL

## 2024-10-21 ENCOUNTER — RESEARCH ENCOUNTER (OUTPATIENT)
Dept: RESEARCH | Facility: HOSPITAL | Age: 49
End: 2024-10-21
Payer: COMMERCIAL

## 2024-10-21 VITALS
OXYGEN SATURATION: 100 % | RESPIRATION RATE: 18 BRPM | WEIGHT: 204.38 LBS | DIASTOLIC BLOOD PRESSURE: 65 MMHG | SYSTOLIC BLOOD PRESSURE: 112 MMHG | HEART RATE: 88 BPM | HEIGHT: 66 IN | BODY MASS INDEX: 32.85 KG/M2

## 2024-10-21 DIAGNOSIS — K74.60 LIVER CIRRHOSIS SECONDARY TO NASH: ICD-10-CM

## 2024-10-21 DIAGNOSIS — K74.60 LIVER CIRRHOSIS SECONDARY TO NASH: Primary | ICD-10-CM

## 2024-10-21 DIAGNOSIS — K75.81 LIVER CIRRHOSIS SECONDARY TO NASH: Primary | ICD-10-CM

## 2024-10-21 DIAGNOSIS — E78.49 OTHER HYPERLIPIDEMIA: ICD-10-CM

## 2024-10-21 DIAGNOSIS — K75.81 LIVER CIRRHOSIS SECONDARY TO NASH: ICD-10-CM

## 2024-10-21 DIAGNOSIS — E11.65 TYPE 2 DIABETES MELLITUS WITH HYPERGLYCEMIA, WITHOUT LONG-TERM CURRENT USE OF INSULIN: ICD-10-CM

## 2024-10-21 DIAGNOSIS — I10 ESSENTIAL HYPERTENSION: ICD-10-CM

## 2024-10-21 DIAGNOSIS — E66.811 CLASS 1 OBESITY DUE TO EXCESS CALORIES WITH SERIOUS COMORBIDITY AND BODY MASS INDEX (BMI) OF 32.0 TO 32.9 IN ADULT: ICD-10-CM

## 2024-10-21 DIAGNOSIS — E66.09 CLASS 1 OBESITY DUE TO EXCESS CALORIES WITH SERIOUS COMORBIDITY AND BODY MASS INDEX (BMI) OF 32.0 TO 32.9 IN ADULT: ICD-10-CM

## 2024-10-21 PROCEDURE — 1159F MED LIST DOCD IN RCRD: CPT | Mod: CPTII,S$GLB,, | Performed by: NURSE PRACTITIONER

## 2024-10-21 PROCEDURE — 3074F SYST BP LT 130 MM HG: CPT | Mod: CPTII,S$GLB,, | Performed by: NURSE PRACTITIONER

## 2024-10-21 PROCEDURE — 99214 OFFICE O/P EST MOD 30 MIN: CPT | Mod: S$GLB,,, | Performed by: NURSE PRACTITIONER

## 2024-10-21 PROCEDURE — 4010F ACE/ARB THERAPY RXD/TAKEN: CPT | Mod: CPTII,S$GLB,, | Performed by: NURSE PRACTITIONER

## 2024-10-21 PROCEDURE — 3008F BODY MASS INDEX DOCD: CPT | Mod: CPTII,S$GLB,, | Performed by: NURSE PRACTITIONER

## 2024-10-21 PROCEDURE — 99999 PR PBB SHADOW E&M-EST. PATIENT-LVL IV: CPT | Mod: PBBFAC,,, | Performed by: NURSE PRACTITIONER

## 2024-10-21 PROCEDURE — 76705 ECHO EXAM OF ABDOMEN: CPT | Mod: 26,,, | Performed by: STUDENT IN AN ORGANIZED HEALTH CARE EDUCATION/TRAINING PROGRAM

## 2024-10-21 PROCEDURE — 3078F DIAST BP <80 MM HG: CPT | Mod: CPTII,S$GLB,, | Performed by: NURSE PRACTITIONER

## 2024-10-21 PROCEDURE — 76705 ECHO EXAM OF ABDOMEN: CPT | Mod: TC

## 2024-10-21 PROCEDURE — 1160F RVW MEDS BY RX/DR IN RCRD: CPT | Mod: CPTII,S$GLB,, | Performed by: NURSE PRACTITIONER

## 2024-10-21 RX ORDER — TIRZEPATIDE 7.5 MG/.5ML
INJECTION, SOLUTION SUBCUTANEOUS
COMMUNITY
Start: 2024-10-11

## 2024-10-21 NOTE — PROGRESS NOTES
RESEARCH STUDY SPECIMEN COLLECTION ENCOUNTER  ORGAN TRANSPLANT  [facility] BAKARI KEVIN    Study Title: Role of Tumor-Induced Immune Tolerance in the Patient Response to Locoregional Therapy: Implications in Assessment Risk of Hepatocellular Carcinoma Recurrence Following Liver Transplantation    IRB #: [id number]    IRB Approval Date: 6/8/2016    : Wilfredo Bhandari MD  Sub-investigator: Mirza Rosales, PhD    Patient Number: [id number]    In accordance with the study protocol, Research Lab orders were placed and follow-up specimens were collected on (date: 10/21/2024) in:  [facility] LAB VNP: YES/NO: Yes  [facility] LABTX: YES/NO: No  [facility] LAB IM: YES/NO: No  Other [facility] location: YES/NO: No   Location: N/A    A  was used to transport blood specimens to [facility] for processing: YES/NO: No  Blood specimens were transported to [facility] for processing: YES/NO: Yes    NOTE: 4 vacutainer tubes were collected instead of the 2 approved by IRB 2016.131.B. Extraneous specimens were destroyed.    Chalo Elizabeth  Admin Research- Liver Transplant

## 2024-10-21 NOTE — PATIENT INSTRUCTIONS
FATTY LIVER  These things are important to improve fatty liver:  Limit alcohol consumption, none   2 Continue Weight loss   3. Avoid processed foods. No fried/fast foods. No sugary drinks or drinks with any calories.   4. Low carb/sugar and high protein diet (100 grams per day of protein).Try to limit your carb intake to LESS than  grams per day total. Use MyFitness Pal or Lose It gayle to add up your carbs through the day. Do NOT drink any beverages with calories or carbs (this can lead to high blood sugar and weight gain). The main thing to focus on is high protein, low carb)  5. Exercise, 5 days per week, 30 minutes per day, as tolerated  6. Recommend good cholesterol, blood pressure, blood sugar levels   7. There is a new medication called Rezdiffra for the treatment of F2-F3 liver scarring due to fatty liver. It is only indicated for patients with significant scar tissue from fatty liver (cannot use Rezdiffra)    In some people, fatty liver can progress to steatohepatitis (inflamatory fatty liver) and possibly to cirrhosis, increasing the risk for liver cancer, liver failure, and death. Therefore, the lifestyle changes are very important to decrease this risk.     Helpful website for MASH/fatty liver: https://mash.[employer]/patient-resources/    CIRRHOSIS     There is a medication for fatty liver and scar tissue called Rezdiffra but currently it cannot be used in cirrhosis as this time     This is a web site that you may find helpful about cirrhosis : https://cirrhosiscare.ca/    Because you have cirrhosis, it is important to attend clinic visits every 6 months with an Ultrasound and blood tests every 6 months to screen for liver cancer (you are at risk of developing liver cancer due to scar tissue in the liver)    Signs and symptoms of worsening liver disease include jaundice, fluid in the belly (ascites), and confusion/disorientation/slowed thought processes due to hepatic encephalopathy (toxins building  up because of liver problems).   You should seek medical attention if any of these things occur.    Also, possible bleeding from esophageal varices (blood vessels in the stomach and foodpipe can burst and cause fatal bleeding).  Therefore, if you have symptoms of vomiting blood, blood in your stool, dark or black stools or vomiting coffee ground vomit, YOU SHOULD GO TO THE EMERGENCY ROOM IMMEDIATELY.     Cirrhosis can increase the risk of liver cancer, liver failure, and death. However, we will watch your liver function score (MELD score) closely with each clinic visit. A normal MELD score is 6, highest is 40. Your last one was an 7. We will check this with every clinic visit. A MELD 15 or higher is when we start to consider transplant because MELD 15 or higher indicates that the liver is not functioning as well     Cirrhosis Counseling  - NO alcohol use (includes beer, wine, and/or liquor)  - avoid non-steroidal anti-inflammatory drugs (NSAIDs) such as ibuprofen, Motrin, naprosyn, Alleve due to the risk of kidney damage  - can take acetaminophen (Tylenol), no more than 2000 mg per day  - low sodium (salt) 2 gram per day diet  - high protein diet: 110 grams per day to prevent muscle mass loss. Drink at least 1 protein shake daily (Premier Protein is best option because it is very high protein and low sugar). Ok to use this as nighttime snack to fit it in   - resistance exercises for muscle strength  - avoid raw seafoods due to the risk of fatal Vibrio vulnificus infection  - ultrasound of the liver every 6 months for liver cancer screening (you are at risk of developing liver cancer due to scar tissue in the liver)  - Upper endoscopy every 1-2 years to screen for varices in the stomach and foodpipe which can burst and cause fatal bleeding

## 2025-02-18 ENCOUNTER — TELEPHONE (OUTPATIENT)
Dept: HEPATOLOGY | Facility: CLINIC | Age: 50
End: 2025-02-18
Payer: COMMERCIAL

## 2025-02-18 NOTE — TELEPHONE ENCOUNTER
An appointment has been scheduled on Friday, May 30, 2025 at 8:00AM (US), 9:00AM (labs) and 10:00AM F/U (Virtual).  Patient confirmed.  Patient will be in-house at [facility] to complete virtual visit.      ----- Message from Florecita sent at 2/17/2025  2:35 PM CST -----  Regarding: Rescheduling Appts  Name Of Caller:    Tesha Preference:   [phone] Nature of call:   Pt would like to reschedule their appts on 04/25. She prefers Friday mornings in May.

## 2025-03-10 NOTE — TELEPHONE ENCOUNTER
EMERGENCY DEPARTMENT NOTE      History and Physical     Patient: Mariama Rodrigez      MRN: [id number]     YOB: 1972     Subjective:     CC: Medical Screening Exam      HPI: Mariama Rodrigez is a 52 year old female, with past medical, surgical, social, and family history reviewed as documented below     52-year-old with past medical history of ESRD on dialysis with left arm fistula presenting to the emergency room from clogged fistula failed unclogging a fistula as outpatient today.  Patient's last dialysis was last Monday.  She went on Wednesday for dialysis and was unable to return any flow.  Had left forearm AV fistula angiogram with a Dr. Brandon Simpson who reported 100%  of cephalic vein in outflow     patient reports fistula was placed by Dr. Osullivan.  She reports she is on transplant list with voucher and is hoping to have transplant in the next several months.  Has important cardiology appointment with transplant team tomorrow afternoon.    She has no complaints at this time other than her clotted fistula        History:     Past Medical History:   Diagnosis Date    A-V fistula (CMD)     Abscess     inner right thigh, has a small abscess on her right inner thigh that she has been putting bactroban and a bandaid on per her PCP and derm. she denies any fevers or infections.    Alcohol abuse 08/09/2017    sober -- last drink 52 days ago from 8/16/2023    Alcohol withdrawal seizure  (CMD)     no history of seizure disorder    Anxiety     Asthma     recent significant weight loss -- pt states she still carries rescue inhaler but has not had any incidents in a \"long time\"    Chalazion of right upper eyelid 12/30/2022    Chronic kidney disease     managed by Dr. Villela (nephro)    Depression     Diastolic heart failure  (CMD)     managed by Dr. Styles (cardio)    Ductal carcinoma in situ (DCIS) of right breast 12/05/2019    s/p lumpectomy and radiation -- in remission -- no limb precautions    Essential  Faxed request to Dr. Campos office to obtain EGD reports. Fax # [phone]   (primary) hypertension     GERD (gastroesophageal reflux disease)     High cholesterol     diet controlled    History of panniculitis 02/06/2023    History of renal dialysis     Hot flashes 02/20/2020    per pt -- told she is \"post menopausal\"    HTN (hypertension)     hx of Obesity 10/25/2019    recent significant weight loss    hx of Obstructive sleep apnea     recent significant weight loss -- resolved, does not use device    hx of Respiratory Failure 10/31/2019    hospitalization -- in association with lymphedema in left breast, no current breathing difficulties    Hypothyroidism     Iron deficiency anemia     managed by Dr. Schmidt    Laceration of scalp, initial encounter 12/19/2022    Leg edema     per pt swelling is better, wearing compression stocking and watching salt intake    Malignant neoplasm  (CMD)     BREAST CANCER    Noncompliance with CPAP treatment 01/07/2021    SYSTEM:This problem was triggered by the Discern Rule because the patient is not compliant with CPAP/BiPAP treatmeant.    PMB (postmenopausal bleeding)     Pre-diabetes     NIDDM -- diet controlled    Right Knee ACL Tear     mild, states currently receiving PT for this as well as a mild MCL tear in same knee    Secondary hyperparathyroidism of renal origin  (CMD)     Syncope and collapse 12/19/2022    Urinary incontinence     pt denies    Weight loss 02/06/2023     Past Surgical History:   Procedure Laterality Date    Abcess drainage  05/17/2024    1.    Left upper extremity incision and drainage, with hematoma evacuation  2.    Exposure of LEFT brachial artery for AV graft ligation  3.    Exposure of LEFT axillary vein for AV graft ligation  4.    Left upper extremity AV graft removal    Av fistula repair  06/12/2024    LEFT upper extremity radiocephalic fistula    Breast lumpectomy  12/12/2019    Colposcopy,loop electrd cervix excis  2015    Dialysis fistula creation  04/23/2024    CREATION, AV FISTULA, UPPER EXTREMITY GRAFT - LEFT     Hysteroscopy  2024    HYSTEROSCOPY, WITH DILATION AND CURETTAGE    Inguinal hernia repair      \"as a teenager\"    Knee arthroscopy w/ acl reconstruction Left     Mammo breast biposy performed external right Right      Family History   Problem Relation Age of Onset    Hypothyroid Mother     Cancer Mother         melanoma    Cancer Father      Social History     Tobacco Use    Smoking status: Former     Current packs/day: 0.00     Types: Cigarettes     Quit date:      Years since quittin.1     Passive exposure: Past    Smokeless tobacco: Never   Vaping Use    Vaping status: never used   Substance Use Topics    Alcohol use: Not Currently     Comment: hx of ETOH abuse -- last drink 24    Drug use: No     Current Outpatient Medications   Medication Instructions    acetaminophen (TYLENOL) 1,000 mg, Oral, EVERY 4 HOURS PRN    carvedilol (COREG) 12.5 mg, Oral, 2 TIMES DAILY WITH MEALS    dilTIAZem (CARDIZEM) 60 mg, Oral, 2 TIMES DAILY    DULoxetine (CYMBALTA) 20 mg, Oral, DAILY, Take one capsule with one 60 mg capsule for a total of 80 mg daily    DULoxetine (CYMBALTA) 60 mg, Oral, DAILY, Take one capsule with one 20 mg capsule for a total of 80 mg daily<BR>    fexofenadine (ALLEGRA) 180 mg, Oral, DAILY WITH BREAKFAST    gabapentin (NEURONTIN) 300 mg, Oral, 2 times daily    hydrOXYzine (ATARAX) 25 mg, Oral, NIGHTLY PRN    levothyroxine 100 mcg, Oral, DAILY BEFORE BREAKFAST    lidocaine-prilocaine (EMLA) 2.5-2.5 % cream APPLY 1 APPLICATION TOPICALLY AS NEEDED FOR PAIN. APPLY TO THE FISTULA PRIOR TO DIALYSIS IF NEEDED FOR PAIN.    Methoxy PEG-Epoetin Beta (MIRCERA IJ) 50 mcg    Multiple Vitamins-Minerals (RENAPLEX-D PO) 1 tablet, Oral, DAILY    oxybutynin (DITROPAN-XL) 10 mg, Oral, NIGHTLY    prazosin (MINIPRESS) 1 MG capsule TAKE 1 CAPSULE BY MOUTH EVERY DAY AT BEDTIME    sevelamer (RENAGEL) 800 MG tablet TAKE 1 TABLET BY MOUTH IN THE MORNING AND 1 AT NOON AND 1 IN THE EVENING WITH MEALS. SWALLOW  TABLET WHOLE; DO NOT CRUSH, BREAK OR CHEW    tamoxifen (NOLVADEX) 20 mg, Oral, DAILY    tiZANidine (ZANAFLEX) 4 MG tablet Take 1/2-1 PO BID prn    torsemide (DEMADEX) 10 mg, Oral, DAILY    trazodone (DESYREL) 150 mg, Oral, NIGHTLY PRN     ALLERGIES:   Allergen Reactions    Cephalexin SWELLING     Lip swelling occurred on day 9 of 10 day course    Citalopram Other (See Comments)     Difficult speaking  Other reaction(s): Other (See Comments)  Difficult speaking    Contrast Media Other (See Comments)     Acute renal failure    Sulfamethoxazole-Trimethoprim HIVES    Vancomycin NAUSEA, ANAPHYLAXIS and Nausea & Vomiting     Red Man Syndrome    Dog Dander Other (See Comments)     Sneezing, runny nose    Monosodium Glutamate   (Food Or Med) GI UPSET     MSG    Cat Dander Other (See Comments)     Sneezing, runny nose    Magnesium Salicylate GI UPSET and DIARRHEA    Seasonal Other (See Comments)     Sneezing, runny nose       Physical Exam:     Vitals:    03/10/25 1040 03/10/25 1243 03/10/25 1343 03/10/25 1413   BP: (!) 143/80 127/78 124/83 123/82   BP Location:  RUE - Right upper extremity  RUE - Right upper extremity   Patient Position:  Sitting  Sitting/High-Betts's   Pulse: 81 71 67 70   Resp: 18 16 13 (!) 12   Temp: 98.2 °F (36.8 °C) 98.4 °F (36.9 °C)     TempSrc: Oral Oral     SpO2: 96% 95% 97% 95%   Weight:       LMP: 11/08/2019     Nursing notes reviewed and agreed with.    Physical Exam  Constitutional:       General: She is not in acute distress.     Appearance: She is not toxic-appearing.   HENT:      Head: Normocephalic.      Nose: Nose normal.      Mouth/Throat:      Mouth: Mucous membranes are moist.   Eyes:      Extraocular Movements: Extraocular movements intact.      Pupils: Pupils are equal, round, and reactive to light.   Cardiovascular:      Rate and Rhythm: Normal rate and regular rhythm.      Pulses: Normal pulses.   Pulmonary:      Effort: Pulmonary effort is normal.   Abdominal:      General: Abdomen  is flat. There is no distension.   Musculoskeletal:         General: Normal range of motion.      Cervical back: Normal range of motion.   Skin:     General: Skin is warm and dry.      Comments: Left forearm fistula without palpable thrill   Neurological:      General: No focal deficit present.      Mental Status: She is alert and oriented to person, place, and time.         Orders placed:  No orders of the defined types were placed in this encounter.    Labs/Data Reviewed:  Results for orders placed or performed during the hospital encounter of 03/10/25   Comprehensive Metabolic Panel    Specimen: Blood, Venous   Result Value Ref Range    Fasting Status      Sodium 133 (L) 135 - 145 mmol/L    Potassium 5.1 3.4 - 5.1 mmol/L    Chloride 105 97 - 110 mmol/L    Carbon Dioxide 19 (L) 21 - 32 mmol/L    Anion Gap 14 7 - 19 mmol/L    Glucose 92 70 - 99 mg/dL     (H) 6 - 20 mg/dL    Creatinine 8.33 (H) 0.51 - 0.95 mg/dL    Glomerular Filtration Rate 5 (L) >=60    BUN/Cr 12 7 - 25    Calcium 9.2 8.4 - 10.2 mg/dL    Bilirubin, Total 0.5 0.2 - 1.0 mg/dL    GOT/AST 10 <=37 Units/L    GPT/ALT 23 <64 Units/L    Alkaline Phosphatase 71 45 - 117 Units/L    Albumin 4.0 3.4 - 5.0 g/dL    Protein, Total 8.0 6.4 - 8.2 g/dL    Globulin 4.0 2.0 - 4.0 g/dL    A/G Ratio 1.0 1.0 - 2.4   CBC with Automated Differential (performable only)    Specimen: Blood, Venous   Result Value Ref Range    WBC 4.7 4.2 - 11.0 K/mcL    RBC 3.05 (L) 4.00 - 5.20 mil/mcL    HGB 10.2 (L) 12.0 - 15.5 g/dL    HCT 30.6 (L) 36.0 - 46.5 %    .3 (H) 78.0 - 100.0 fl    MCH 33.4 26.0 - 34.0 pg    MCHC 33.3 32.0 - 36.5 g/dL    RDW-CV 15.5 (H) 11.0 - 15.0 %    RDW-SD 56.1 (H) 39.0 - 50.0 fL     (L) 140 - 450 K/mcL    NRBC 0 <=0 /100 WBC    Neutrophil, Percent 66 %    Lymphocytes, Percent 25 %    Mono, Percent 7 %    Eosinophils, Percent 2 %    Basophils, Percent 0 %    Immature Granulocytes 0 %    Absolute Neutrophils 3.1 1.8 - 7.7 K/mcL    Absolute  Lymphocytes 1.2 1.0 - 4.8 K/mcL    Absolute Monocytes 0.3 0.3 - 0.9 K/mcL    Absolute Eosinophils  0.1 0.0 - 0.5 K/mcL    Absolute Basophils 0.0 0.0 - 0.3 K/mcL    Absolute Immature Granulocytes 0.0 0.0 - 0.2 K/mcL   Magnesium    Specimen: Blood, Venous   Result Value Ref Range    Magnesium 3.1 (H) 1.7 - 2.4 mg/dL       Imaging:    IR TUNNELED CENTRAL LINE INSERTION AGE 5 OR OLDER    (Results Pending)       EKG with Rhythm Strip and Cardiac Monitor:     ECG/Rhythm Strip:   ECG: Rate: 73 bpm.  QRS 84ms. QTc 439ms.    NSR   No ischemic changes noted .    My interpretation is normal rate, normal rhythm,no ectopy    Pulse Ox was ordered and reviewed: 95% on RA. adequate    Procedures:   Procedures    Assessment / Plan / MDM:   Mariama Rodrigez is a 52 year old female, with history, as above, who presents to ED with chief complaint of Medical Screening Exam    After obtaining the patient's history, performing the physical exam, multiple initial diagnoses were considered   based on the presenting problem. Initial impressions/plan prior to diagnostics as follows:     Clotted fistula: cbc, cmp, magnesium    Differential Diagnosis: clotted fistula      Additional diagnosis considered but determined to be less likely include but not limited to ACS, PE, dissection, DVT, electrolyte abnormality requiring emergent dialysis etc.    MDM:  -Patient is hemodynamically stable, afebrile, and non-toxic    Patient's laboratory results consistent with ESRD.  Does not require emergent dialysis at this time.  See ED course regarding decision making for tunnel line.    Patient will come in for tunneled line today.    She is n.p.o. at this time    ED Course as of 03/10/25 1535   Mon Mar 10, 2025   1344 Discussed with Dr. Osullivan. He advised to talk to IR as there is not much to do at this time from his perspective she would need a new fistula in the future [DG]   1355 Discussed with Dr. Villela for nephro and Rojelio for BP [DG]   1352  Discussed with Dr. Cazares. Will clarify if needing tunneled line or temporary line [DG]   1414 Will get dialysis cath placed today. I confirmed with Dr. Reynolds tunneled line [DG]      ED Course User Index  [DG] Giovani Pierson MD       Workup:  Orders Placed This Encounter    IR TUNNELED CENTRAL LINE INSERTION AGE 5 OR OLDER    CBC with Automated Differential    Comprehensive Metabolic Panel    CBC with Automated Differential (performable only)    Magnesium    Hepatitis B Surface Antigen    Hepatitis B Surface Antibody, Quantitative    Hepatitis B Core Total Antibody    CBC with Automated Differential    Magnesium    Comprehensive Metabolic Panel    Electrocardiogram 12-Lead    Electrocardiogram 12-Lead    Full Resuscitation    Inpatient consult to Nephrology    Oxygen Therapy PRN greater than 90%    Insert IV    Hemodialysis Scheduling Routine Every M, W & F    Assign to Observation    NPO Diet with Exceptions; Medications; Yes, Medical Nutrition Management by RD (Registered Dietitian)    No Isolation    Notify: Assessments    Notify: Treatment    Saline flush for dialysis machine    Hemodialysis Configuration    Vital Signs    Pulse Oximetry    Bladder scan    Urinary Retention Protocol    Notify: per Routine Standards    Activity    Intake and output    Weigh    VTE/DVT Pharmacologic prophylaxis already ordered    Intermittent Pneumatic Sequential Compression Device (SCDs)    Metered blood glucose    sodium citrate anticoagulant 4 % flush 3 mL    alteplase (CATHFLO ACTIVASE) injection 2 mg    sodium chloride (NORMAL SALINE) 0.9 % bolus 100-200 mL    sodium chloride 0.9 % injection 10 mL    sodium chloride 0.9 % injection 2 mL    OR Linked Order Group     acetaminophen (TYLENOL) tablet 650 mg     acetaminophen (TYLENOL) suppository 650 mg    OR Linked Order Group     ondansetron (ZOFRAN ODT) disintegrating tablet 4 mg     ondansetron (ZOFRAN) injection 4 mg    polyethylene glycol (MIRALAX) packet 17 g     melatonin tablet 3 mg       Abnormal Labs: Ordered and interpreted by me  Labs Reviewed   COMPREHENSIVE METABOLIC PANEL - Abnormal; Notable for the following components:       Result Value    Sodium 133 (*)     Carbon Dioxide 19 (*)      (*)     Creatinine 8.33 (*)     Glomerular Filtration Rate 5 (*)     All other components within normal limits   CBC WITH AUTOMATED DIFFERENTIAL (PERFORMABLE ONLY) - Abnormal; Notable for the following components:    RBC 3.05 (*)     HGB 10.2 (*)     HCT 30.6 (*)     .3 (*)     RDW-CV 15.5 (*)     RDW-SD 56.1 (*)      (*)     All other components within normal limits    Narrative:     This is an appended report.  These results have been appended to a previously verified report.   MAGNESIUM - Abnormal; Notable for the following components:    Magnesium 3.1 (*)     All other components within normal limits   CBC WITH DIFFERENTIAL    Narrative:     The following orders were created for panel order CBC with Automated Differential.  Procedure                               Abnormality         Status                     ---------                               -----------         ------                     CBC with Automated Dif...[[id number]]  Abnormal            Final result                 Please view results for these tests on the individual orders.   HEPATITIS B SURFACE ANTIGEN   HEPATITIS B SURFACE ANTIBODY, QUANTITATIVE   HEPATITIS B CORE TOTAL ANTIBODY       Imaging Ordered: Images ordered and reviewed by me  IR TUNNELED CENTRAL LINE INSERTION AGE 5 OR OLDER    (Results Pending)       Additional history obtained from:  Additional records reviewed: ashlee HARTMAN, outpatient declogging procedure notes  Additional imaging considered:  Independent interpretation of tests: Left forearm fistula without palpable thrill  Additional labs considered:  Chronic conditions that impact care: ESRD on dialysis for which she had fistula placed which is the reason for her  visit today as it has clogged  Social barriers to care:    DIAGNOSIS: After the evaluation in the ED, my clinical impression(s) are:  1. Complication of arteriovenous dialysis fistula, initial encounter        Medications Given in ED   Current Facility-Administered Medications   Medication Dose Route Frequency Provider Last Rate Last Admin    sodium chloride 0.9 % injection 2 mL  2 mL Intracatheter 2 times per day Misa Graham, CNP           DISPOSITION:   observation    New prescriptions:  New Prescriptions    No medications on file     Follow up:  No follow-up provider specified.    Giovani Pierson MD  3:35 PM 03/10/25       Note to patient: The 21st Century Cures Act makes medical notes like these available to patients in the interest of transparency. Please be advised this is a medical document. It is intended for [id number] communication. It is written in medical language and may contain unfamiliar abbreviations or verbiage. Components may appear blunt or direct. Medical documents are intended to carry relevant information, facts as evident, and the clinical opinion of the practitioner at the time of the encounter.     This note was generated by the Epic EMR system/ Dragon speech recognition and may contain inherent errors or omissions not intended by the user. Grammatical errors, random word insertions, deletions, pronoun errors and incomplete sentences are occasional consequences of this technology. Not all errors are caught or corrected. If there are questions or concerns about the content of this note or information contained within the body of this dictation they should be addressed directly with the author for clarification      Giovani Pierson MD  03/10/25 1537

## 2025-03-14 ENCOUNTER — PATIENT MESSAGE (OUTPATIENT)
Dept: HEPATOLOGY | Facility: CLINIC | Age: 50
End: 2025-03-14
Payer: COMMERCIAL

## 2025-05-30 ENCOUNTER — RESULTS FOLLOW-UP (OUTPATIENT)
Dept: HEPATOLOGY | Facility: CLINIC | Age: 50
End: 2025-05-30

## 2025-05-30 ENCOUNTER — OFFICE VISIT (OUTPATIENT)
Dept: HEPATOLOGY | Facility: CLINIC | Age: 50
End: 2025-05-30
Payer: COMMERCIAL

## 2025-05-30 ENCOUNTER — HOSPITAL ENCOUNTER (OUTPATIENT)
Dept: RADIOLOGY | Facility: HOSPITAL | Age: 50
Discharge: HOME | End: 2025-05-30
Attending: NURSE PRACTITIONER
Payer: COMMERCIAL

## 2025-05-30 VITALS — WEIGHT: 180 LBS | BODY MASS INDEX: 28.93 KG/M2 | HEIGHT: 66 IN

## 2025-05-30 DIAGNOSIS — E66.3 OVERWEIGHT (BMI 25.0-29.9): ICD-10-CM

## 2025-05-30 DIAGNOSIS — K74.60 LIVER CIRRHOSIS SECONDARY TO NASH: ICD-10-CM

## 2025-05-30 DIAGNOSIS — I10 ESSENTIAL HYPERTENSION: ICD-10-CM

## 2025-05-30 DIAGNOSIS — K74.60 LIVER CIRRHOSIS SECONDARY TO NASH: Primary | ICD-10-CM

## 2025-05-30 DIAGNOSIS — K75.81 LIVER CIRRHOSIS SECONDARY TO NASH: Primary | ICD-10-CM

## 2025-05-30 DIAGNOSIS — E78.49 OTHER HYPERLIPIDEMIA: ICD-10-CM

## 2025-05-30 DIAGNOSIS — K75.81 LIVER CIRRHOSIS SECONDARY TO NASH: ICD-10-CM

## 2025-05-30 DIAGNOSIS — E11.9 TYPE 2 DIABETES MELLITUS WITHOUT COMPLICATION, WITHOUT LONG-TERM CURRENT USE OF INSULIN: ICD-10-CM

## 2025-05-30 PROCEDURE — 76705 ECHO EXAM OF ABDOMEN: CPT | Mod: 26,,, | Performed by: RADIOLOGY

## 2025-05-30 PROCEDURE — 76705 ECHO EXAM OF ABDOMEN: CPT | Mod: TC

## 2025-05-30 NOTE — PATIENT INSTRUCTIONS
FATTY LIVER  These things are important to improve fatty liver:  Limit alcohol consumption, none given cirrhosis   2 Continue Weight loss   3. Avoid processed foods. No fried/fast foods. No sugary drinks or drinks with any calories.   4. Low carb/sugar and high protein diet (100 grams per day of protein).Try to limit your carb intake to LESS than  grams per day total. Use MyFitness Pal or Lose It gayle to add up your carbs through the day. Do NOT drink any beverages with calories or carbs (this can lead to high blood sugar and weight gain). The main thing to focus on is high protein, low carb)  5. Exercise, 5 days per week, 30 minutes per day, as tolerated  6. Recommend good cholesterol, blood pressure, blood sugar levels   7. There is a new medication called Rezdiffra for the treatment of F2-F3 liver scarring due to fatty liver. It is only indicated for patients with significant scar tissue from fatty liver (cannot use with cirrhosis currently)    In some people, fatty liver can progress to steatohepatitis (inflamatory fatty liver) and possibly to cirrhosis, increasing the risk for liver cancer, liver failure, and death. Therefore, the lifestyle changes are very important to decrease this risk.     Helpful website for MASH/fatty liver: https://mash.[employer]/patient-resources/    CIRRHOSIS     There is a medication for fatty liver and scar tissue called Rezdiffra but currently it cannot be used in cirrhosis as this time     This is a web site that you may find helpful about cirrhosis : https://cirrhosiscare.ca/    Because you have cirrhosis, it is important to attend clinic visits every 6 months with an Ultrasound and blood tests every 6 months to screen for liver cancer (you are at risk of developing liver cancer due to scar tissue in the liver)    Signs and symptoms of worsening liver disease include jaundice, fluid in the belly (ascites), and confusion/disorientation/slowed thought processes due to hepatic  encephalopathy (toxins building up because of liver problems).   You should seek medical attention if any of these things occur.    Also, possible bleeding from esophageal varices (blood vessels in the stomach and foodpipe can burst and cause fatal bleeding).  Therefore, if you have symptoms of vomiting blood, blood in your stool, dark or black stools or vomiting coffee ground vomit, YOU SHOULD GO TO THE EMERGENCY ROOM IMMEDIATELY.     Cirrhosis can increase the risk of liver cancer, liver failure, and death. However, we will watch your liver function score (MELD score) closely with each clinic visit. A normal MELD score is 6, highest is 40. Your last one was an 7. We will check this with every clinic visit. A MELD 15 or higher is when we start to consider transplant because MELD 15 or higher indicates that the liver is not functioning as well     Cirrhosis Counseling  - NO alcohol use (includes beer, wine, and/or liquor)  - avoid non-steroidal anti-inflammatory drugs (NSAIDs) such as ibuprofen, Motrin, naprosyn, Alleve due to the risk of kidney damage  - can take acetaminophen (Tylenol), no more than 2000 mg per day  - low sodium (salt) 2 gram per day diet  - high protein diet: 110 grams per day to prevent muscle mass loss. Drink at least 1 protein shake daily (Premier Protein is best option because it is very high protein and low sugar). Ok to use this as nighttime snack to fit it in   - resistance exercises for muscle strength  - avoid raw seafoods due to the risk of fatal Vibrio vulnificus infection  - ultrasound of the liver every 6 months for liver cancer screening (you are at risk of developing liver cancer due to scar tissue in the liver)  - Upper endoscopy every 1-2 years to screen for varices in the stomach and foodpipe which can burst and cause fatal bleeding

## 2025-05-30 NOTE — PROGRESS NOTES
The patient location is: [place]  The chief complaint leading to consultation is: see below    Visit type: audiovisual    Face to Face time with patient: 30 minutes of total time spent on the encounter, which includes face to face time and non-face to face time preparing to see the patient (eg, review of tests), Obtaining and/or reviewing separately obtained history, Documenting clinical information in the electronic or other health record, Independently interpreting results (not separately reported) and communicating results to the patient/family/caregiver, or Care coordination (not separately reported).         Each patient to whom he or she provides medical services by telemedicine is:  (1) informed of the relationship between the physician and patient and the respective role of any other health care provider with respect to management of the patient; and (2) notified that he or she may decline to receive medical services by telemedicine and may withdraw from such care at any time.    Notes:     [facility] Established Patient Visit    Reason for Visit:  Well-compensated cirrhosis due to ARAYA    PCP: Gildardo Badillo (Inactive)    HPI:  This is a 49 y.o. female with PMH noted below, here for follow up of above     Diagnosis of cirrhosis based on Biopsy done 4/2019 at outside facility - noted ARAYA, cirrhosis     Previous serologic w/u was negative for Dylan's, alpha-1 antitrypsin deficiency, hemochromatosis, autoimmune etiology, and viral hepatitis B and C     Risk factors for NAFLD include obesity, HTN, HLD, DM, PCOS     Interval HPI: Presents today via video visit. No s/s of hepatic decompensation: no ascites, HE or h/o variceal bleeding  On Monjouro 10 mg weekly, doing great with weight loss, current wt 180s, decreased from 228 in the past year  Following smaller portions, trying to eat lower carbs     Abd U/S done 5/2025 showed fatty liver, no lesions,  splenomegaly (12.2cm)    Lab Results    Component Value Date    ALT 18 05/30/2025    AST 17 05/30/2025    ALKPHOS 93 05/30/2025    BILITOT 0.6 05/30/2025    ALBUMIN 4.2 05/30/2025    INR 0.9 05/30/2025     05/30/2025     MELD 3.0: 7 at 5/30/2025  8:59 AM  MELD-Na: 6 at 5/30/2025  8:59 AM  Calculated from:  Serum Creatinine: 0.9 mg/dL (Using min of 1 mg/dL) at 5/30/2025  8:59 AM  Serum Sodium: 140 mmol/L (Using max of 137 mmol/L) at 5/30/2025  8:59 AM  Total Bilirubin: 0.6 mg/dL (Using min of 1 mg/dL) at 5/30/2025  8:59 AM  Serum Albumin: 4.2 g/dL (Using max of 3.5 g/dL) at 5/30/2025  8:59 AM  INR(ratio): 0.9 (Using min of 1) at 5/30/2025  8:59 AM  Age at listing (hypothetical): 49 years  Sex: Female at 5/30/2025  8:59 AM    MELD 7     Cirrhosis Health Maintenance:   -- Last EGD :  outside GI Dr. Cam[facility] 10/2023 without varices, next due 10/2025  Colon 2028  -- HCC screening              U/S  no lesions, next due 11/2025              AFP  WNL, next due 11/2025  Lab Results   Component Value Date    AFP <2.0 10/21/2024     -- Immunity to Hep A and B - completed [employer]     Denies family history of liver disease . Denies current alcohol consumption  or history of significant alcohol consumption in past     PMHX:  has a past medical history of Diabetes, Essential hypertension (6/10/2019), Gastroparesis, Liver cirrhosis secondary to ARAYA (6/18/2019), Obesity (BMI 30-39.9) (9/8/2020), Other hyperlipidemia (6/10/2019), PCOS (polycystic ovarian syndrome), and Type 2 diabetes mellitus (6/10/2019).    PSHX:  has a past surgical history that includes Cholecystectomy (2000).    The patient's social and family histories were reviewed by me and updated in the appropriate section of the electronic medical record.    Review of patient's allergies indicates:  No Known Allergies    Current Outpatient Medications on File Prior to Visit   Medication Sig Dispense Refill    atorvastatin (LIPITOR) 20 MG tablet Take 20 mg by mouth once  "daily.  1    lisinopril (PRINIVIL,ZESTRIL) 2.5 MG tablet Take 2.5 mg by mouth once daily.  3    MOUNJARO 7.5 mg/0.5 mL PnIj INJECT 7.5 MILLIGRAM(S) SUBCUTANEOUSLY ONCE A WEEK      SYNJARDY XR 25-1,000 mg TBph Take 2 tablets by mouth once daily.  1     No current facility-administered medications on file prior to visit.       SOCIAL HISTORY:   Social History     Substance and Sexual Activity   Alcohol Use Not Currently       Social History     Substance and Sexual Activity   Drug Use Never       ROS:   GENERAL: Denies fatigue  CARDIOVASCULAR: Denies edema  GI: Denies abdominal pain  SKIN: Denies rash, itching   NEURO: Denies confusion, memory loss, or mood changes    Objective Findings:    PHYSICAL EXAM:   Friendly White female, in no acute distress; alert and oriented to person, place and time  VITALS: Ht 5' 6" (1.676 m)   Wt 81.6 kg (180 lb)   BMI 29.05 kg/m²   EYES: Sclerae anicteric  GI: Soft, non-tender, non-distended. No ascites.  EXTREMITIES:  No edema.  SKIN: Warm and dry. No jaundice. No telangectasias noted. No palmar erythema.  NEURO:  No asterixis.  PSYCH:  Thought and speech pattern appropriate. Behavior normal      EDUCATION:  See instructions discussed with patient in Instructions section of the After Visit Summary       ASSESSMENT & PLAN:  49 y.o. White female with:  1.  Cirrhosis, due to ARAYA, well compensated  Not a candidate for Rezdiffra given cirrhosis on biopsy, will continue to monitor if anything changes with med indications   --- cirrhosis diagnosis based on ARAYA  -- MELD 3.0: 7 at 10/21/2024  7:41 AM  MELD-Na: 6 at 10/21/2024  7:41 AM  Calculated from:  Serum Creatinine: 0.8 mg/dL (Using min of 1 mg/dL) at 10/21/2024  7:41 AM  Serum Sodium: 137 mmol/L at 10/21/2024  7:41 AM  Total Bilirubin: 0.4 mg/dL (Using min of 1 mg/dL) at 10/21/2024  7:41 AM  Serum Albumin: 4 g/dL (Using max of 3.5 g/dL) at 10/21/2024  7:41 AM  INR(ratio): 1 at 10/21/2024  7:41 AM  Age at listing (hypothetical): 48 " years  Sex: Female at 10/21/2024  7:41 AM    -- HCC screening: AFP and abd. U/S.. U/S showed no lesions, AFP WNL - both next due 11/2025  -- EGD due 10/2025 with Dr. Campos in [place]  --- Previous serologic w/u in [facility]  -- Cirrhosis counseling as noted above and discussed with patient     2.  Metabolic associated steatotic liver disease   -- risk factors for fatty liver: obesity, HTN, HLD, DM, PCOS  Recommend:  1. Continue Weight loss   2. Low carb/sugar, high fiber and protein diet  3. Exercise, 5 days per week, 30 minutes per day, as tolerated  4. Recommend good cholesterol, blood pressure, blood sugar levels   5. Cannot use Rezdiffra with cirrhosis currently         Follow up in about 6 months (around 11/30/2025). with US and labs same day due to travel   Orders Placed This Encounter   Procedures    US Abdomen Limited    CBC Without Differential    Comprehensive Metabolic Panel    Protime-INR    Alpha-Fetoprotein and AFP L-3        Thank you for allowing me to participate in the care of ISI Daniels    I spent a total of 30 minutes on the day of the visit.This includes face to face time and non-face to face time preparing to see the patient (eg, review of tests), obtaining and/or reviewing separately obtained history, documenting clinical information in the electronic or other health record, independently interpreting results and communicating results to the patient/family/caregiver, and coordinating care.       CC'ed note to: